# Patient Record
Sex: MALE | Race: WHITE | NOT HISPANIC OR LATINO | Employment: FULL TIME | URBAN - METROPOLITAN AREA
[De-identification: names, ages, dates, MRNs, and addresses within clinical notes are randomized per-mention and may not be internally consistent; named-entity substitution may affect disease eponyms.]

---

## 2019-05-08 LAB
PSA: 0.9
TESTOST SERPL-MCNC: 674 NG/DL

## 2019-12-13 ENCOUNTER — OFFICE VISIT (OUTPATIENT)
Dept: FAMILY MEDICINE | Facility: CLINIC | Age: 49
End: 2019-12-13
Payer: COMMERCIAL

## 2019-12-13 VITALS
HEIGHT: 69 IN | HEART RATE: 61 BPM | DIASTOLIC BLOOD PRESSURE: 64 MMHG | SYSTOLIC BLOOD PRESSURE: 91 MMHG | WEIGHT: 156 LBS | TEMPERATURE: 98 F | BODY MASS INDEX: 23.11 KG/M2

## 2019-12-13 DIAGNOSIS — J02.9 SORE THROAT: Primary | ICD-10-CM

## 2019-12-13 DIAGNOSIS — J02.0 STREP THROAT: ICD-10-CM

## 2019-12-13 PROBLEM — N41.1 CHRONIC PROSTATITIS: Status: ACTIVE | Noted: 2019-12-13

## 2019-12-13 PROBLEM — K21.9 GASTROESOPHAGEAL REFLUX DISEASE WITHOUT ESOPHAGITIS: Status: ACTIVE | Noted: 2019-12-07

## 2019-12-13 PROBLEM — E04.1 THYROID NODULE: Status: ACTIVE | Noted: 2019-12-07

## 2019-12-13 PROCEDURE — 3008F BODY MASS INDEX DOCD: CPT | Mod: CPTII,S$GLB,, | Performed by: FAMILY MEDICINE

## 2019-12-13 PROCEDURE — 99203 PR OFFICE/OUTPT VISIT, NEW, LEVL III, 30-44 MIN: ICD-10-PCS | Mod: S$GLB,,, | Performed by: FAMILY MEDICINE

## 2019-12-13 PROCEDURE — 99203 OFFICE O/P NEW LOW 30 MIN: CPT | Mod: S$GLB,,, | Performed by: FAMILY MEDICINE

## 2019-12-13 PROCEDURE — 99999 PR PBB SHADOW E&M-NEW PATIENT-LVL III: ICD-10-PCS | Mod: PBBFAC,,, | Performed by: FAMILY MEDICINE

## 2019-12-13 PROCEDURE — 99999 PR PBB SHADOW E&M-NEW PATIENT-LVL III: CPT | Mod: PBBFAC,,, | Performed by: FAMILY MEDICINE

## 2019-12-13 PROCEDURE — 3008F PR BODY MASS INDEX (BMI) DOCUMENTED: ICD-10-PCS | Mod: CPTII,S$GLB,, | Performed by: FAMILY MEDICINE

## 2019-12-13 RX ORDER — FOLIC ACID 1 MG/1
1 TABLET ORAL DAILY
COMMUNITY
End: 2020-10-16

## 2019-12-13 RX ORDER — PREDNISONE 20 MG/1
20 TABLET ORAL DAILY
Qty: 5 TABLET | Refills: 0 | Status: SHIPPED | OUTPATIENT
Start: 2019-12-13 | End: 2019-12-18

## 2019-12-13 RX ORDER — VIT C/E/ZN/COPPR/LUTEIN/ZEAXAN 250MG-90MG
1000 CAPSULE ORAL
COMMUNITY

## 2019-12-13 RX ORDER — AMOXICILLIN 875 MG/1
TABLET, FILM COATED ORAL
COMMUNITY
End: 2020-09-21

## 2019-12-13 RX ORDER — PANTOPRAZOLE SODIUM 40 MG/1
40 TABLET, DELAYED RELEASE ORAL
COMMUNITY
Start: 2016-02-02 | End: 2021-06-16 | Stop reason: SDUPTHER

## 2019-12-13 RX ORDER — NICOTINE POLACRILEX 2 MG
GUM BUCCAL
COMMUNITY

## 2019-12-13 RX ORDER — CRANBERRY FRUIT EXTRACT 650 MG
1 CAPSULE ORAL
COMMUNITY

## 2019-12-13 RX ORDER — PROMETHAZINE HYDROCHLORIDE AND DEXTROMETHORPHAN HYDROBROMIDE 6.25; 15 MG/5ML; MG/5ML
SYRUP ORAL
Refills: 0 | COMMUNITY
Start: 2019-12-07 | End: 2020-10-16

## 2019-12-13 NOTE — PROGRESS NOTES
=========  PLAN:      Problem List Items Addressed This Visit     Sore throat - Primary    Relevant Medications    predniSONE (DELTASONE) 20 MG tablet    Strep throat     Discussed condition course and signs and symptoms to expect.  Patient advised take anti-inflammatories and or Tylenol for pain or fever.  ER precautions.  Call MD or follow-up to clinic if not improving or worsening symptoms.\           Relevant Medications    predniSONE (DELTASONE) 20 MG tablet        Future Appointments     Date Provider Specialty Appt Notes    1/10/2020 Chato Franklin MD Family Medicine annual           Medication List with Changes/Refills   New Medications    PREDNISONE (DELTASONE) 20 MG TABLET    Take 1 tablet (20 mg total) by mouth once daily. for 5 days   Current Medications    AMOXICILLIN (AMOXIL) 875 MG TABLET    amoxicillin 875 mg tablet   Take 1 tablet every 12 hours by oral route with meals.    BIOTIN 1 MG CAP    Take by mouth.    CHOLECALCIFEROL, VITAMIN D3, (VITAMIN D3) 25 MCG (1,000 UNIT) CAPSULE    Take 1,000 Units by mouth.    FOLIC ACID (FOLVITE) 1 MG TABLET    Take 1 mg by mouth once daily.    PANTOPRAZOLE (PROTONIX) 40 MG TABLET    Take 40 mg by mouth.    PRASTERONE, DHEA, 25 MG CAP    Take 1 capsule by mouth.    PROMETHAZINE-DEXTROMETHORPHAN (PROMETHAZINE-DM) 6.25-15 MG/5 ML SYRP    take ONE TEASPOONFUL BY MOUTH AT BEDTIME       Chato Franklin M.D.     ==========================================================================  Subjective:      Patient ID: Fortino Castro is a 49 y.o. male.  has a past medical history of GERD (gastroesophageal reflux disease).     Chief Complaint: Establish Care and Sore Throat      Problem List Items Addressed This Visit     Sore throat - Primary    Strep throat    Overview     New problem.    + at outside facility about a week ago.  Started last Saturday       Took amoxicillin   Medrol dose pack not getting any better.  Patient feels lump in his throat and getting  hoarse.  Associated with cough.             Current Assessment & Plan     Discussed condition course and signs and symptoms to expect.  Patient advised take anti-inflammatories and or Tylenol for pain or fever.  ER precautions.  Call MD or follow-up to clinic if not improving or worsening symptoms.\                  Past Medical History:  Past Medical History:   Diagnosis Date    GERD (gastroesophageal reflux disease)      Past Surgical History:   Procedure Laterality Date    HERNIA REPAIR       Review of patient's allergies indicates:   Allergen Reactions    Clarithromycin Palpitations     Medication List with Changes/Refills   New Medications    PREDNISONE (DELTASONE) 20 MG TABLET    Take 1 tablet (20 mg total) by mouth once daily. for 5 days   Current Medications    AMOXICILLIN (AMOXIL) 875 MG TABLET    amoxicillin 875 mg tablet   Take 1 tablet every 12 hours by oral route with meals.    BIOTIN 1 MG CAP    Take by mouth.    CHOLECALCIFEROL, VITAMIN D3, (VITAMIN D3) 25 MCG (1,000 UNIT) CAPSULE    Take 1,000 Units by mouth.    FOLIC ACID (FOLVITE) 1 MG TABLET    Take 1 mg by mouth once daily.    PANTOPRAZOLE (PROTONIX) 40 MG TABLET    Take 40 mg by mouth.    PRASTERONE, DHEA, 25 MG CAP    Take 1 capsule by mouth.    PROMETHAZINE-DEXTROMETHORPHAN (PROMETHAZINE-DM) 6.25-15 MG/5 ML SYRP    take ONE TEASPOONFUL BY MOUTH AT BEDTIME      Social History     Tobacco Use    Smoking status: Never Smoker    Smokeless tobacco: Former User   Substance Use Topics    Alcohol use: Not Currently     Frequency: Never      Family History   Problem Relation Age of Onset    Arrhythmia Mother     Alzheimer's disease Father        I have reviewed the complete PMH, social history, surgical history, allergies and medications.  As well as family history.    Review of Systems   Constitutional: Negative for chills, fatigue, fever and unexpected weight change.   HENT: Positive for postnasal drip and sore throat. Negative for ear pain.   "  Eyes: Negative for redness and visual disturbance.   Respiratory: Negative for cough and shortness of breath.    Cardiovascular: Negative for chest pain and palpitations.   Gastrointestinal: Negative for nausea and vomiting.   Endocrine: Negative for cold intolerance and heat intolerance.   Genitourinary: Negative for difficulty urinating and hematuria.   Musculoskeletal: Negative for arthralgias and myalgias.   Skin: Negative for rash and wound.   Allergic/Immunologic: Negative for environmental allergies and food allergies.   Neurological: Negative for weakness and headaches.   Hematological: Negative for adenopathy. Does not bruise/bleed easily.   Psychiatric/Behavioral: Negative for sleep disturbance. The patient is not nervous/anxious.      Objective:   BP 91/64   Pulse 61   Temp 98.2 °F (36.8 °C) (Oral)   Ht 5' 9" (1.753 m)   Wt 70.8 kg (156 lb)   BMI 23.04 kg/m²   Physical Exam   Constitutional: He is oriented to person, place, and time. He appears well-developed and well-nourished. No distress.   HENT:   Head: Normocephalic and atraumatic.   Right Ear: Hearing and external ear normal.   Left Ear: Hearing and external ear normal.   Nose: Mucosal edema and rhinorrhea present.   Mouth/Throat: Posterior oropharyngeal erythema present. No oropharyngeal exudate.   Eyes: Pupils are equal, round, and reactive to light. EOM are normal.   Neck: Normal range of motion. Neck supple.   Cardiovascular: Normal rate, regular rhythm, normal heart sounds and intact distal pulses.   No murmur heard.  Pulmonary/Chest: Effort normal and breath sounds normal. No respiratory distress. He has no wheezes.   Musculoskeletal: Normal range of motion. He exhibits no edema.   Neurological: He is alert and oriented to person, place, and time. No cranial nerve deficit.   Skin: Skin is warm and dry. Capillary refill takes less than 2 seconds.   Psychiatric: He has a normal mood and affect. His behavior is normal.   Nursing note and " vitals reviewed.      Assessment:     1. Sore throat    2. Strep throat      MDM:     I have Reviewed and summarized old records.  I have performed thorough medication reconciliation today and discussed risk and benefits of each medication.  I have reviewed labs and discussed with patient.  All questions were answered.  I am requesting old records and will review them once they are available.    I have signed for the following orders AND/OR meds.  No orders of the defined types were placed in this encounter.    Medications Ordered This Encounter   Medications    predniSONE (DELTASONE) 20 MG tablet     Sig: Take 1 tablet (20 mg total) by mouth once daily. for 5 days     Dispense:  5 tablet     Refill:  0        Follow up in about 4 weeks (around 1/10/2020), or if symptoms worsen or fail to improve, for Annual Wellness Exam.    If no improvement in symptoms or symptoms worsen, advised to call/follow-up at clinic or go to ER. Patient voiced understanding and all questions/concerns were addressed.     DISCLAIMER: This note was compiled by using a speech recognition dictation system and therefore please be aware that typographical / speech recognition errors can and do occur.  Please contact me if you see any errors specifically.    Chato Franklin M.D.       Office: [phone]   [address] [place] [address]  FAX: [phone]  =============================================  GOAL of current visit:     Previous PCP:none  Specialists:Eliu Almonte endocrinologist  Recent lab work:Dr. Eliu Almonte  Recent imaging: Dr. Eliu Almonte  Colonoscopy History:none    Health Maintenance Due   Topic Date Due    Lipid Panel  1971    TETANUS VACCINE  09/30/1989     ======================================================

## 2019-12-13 NOTE — ASSESSMENT & PLAN NOTE
Discussed condition course and signs and symptoms to expect.  Patient advised take anti-inflammatories and or Tylenol for pain or fever.  ER precautions.  Call MD or follow-up to clinic if not improving or worsening symptoms.\

## 2019-12-13 NOTE — PATIENT INSTRUCTIONS
Follow up if symptoms worsen or fail to improve.     If no improvement in symptoms or symptoms worsen, please be advised to call MD, follow-up at clinic and/or go to ER if becomes severe.    Chato Franklin M.D.         We Offer TELEHEATLH & Same Day Appointments!   Book your Telehealth appointment with me through my nurse or   Clinic appointments on [employer]!    [address] [place] [address]    Office: [phone]     FAX: [phone]    Check out my Facebook Page and Follow Me at: CLICK HERE    Check out my website at [employer] by clicking on: CLICK HERE    To Schedule appointments online, go to [employer]: CLICK HERE     Location: [email or web address]

## 2019-12-19 ENCOUNTER — PATIENT OUTREACH (OUTPATIENT)
Dept: ADMINISTRATIVE | Facility: HOSPITAL | Age: 49
End: 2019-12-19

## 2020-09-16 ENCOUNTER — TELEPHONE (OUTPATIENT)
Dept: FAMILY MEDICINE | Facility: CLINIC | Age: 50
End: 2020-09-16

## 2020-09-16 NOTE — TELEPHONE ENCOUNTER
----- Message from Chary Robles sent at 9/16/2020 10:35 AM CDT -----  Please call lpt @ [phone] regarding schedule a vritual appt today, no availabe appt,

## 2020-09-16 NOTE — TELEPHONE ENCOUNTER
Returned a call to the patient, patient states that he got an appointment with Dr. Serna today on a video visit but was curious of how it worked.  Advised patient how a virtual visit is handled and walked patient through the process.  Patient verbalized understanding of this.

## 2020-09-18 ENCOUNTER — TELEPHONE (OUTPATIENT)
Dept: FAMILY MEDICINE | Facility: CLINIC | Age: 50
End: 2020-09-18

## 2020-09-18 DIAGNOSIS — Z03.818 ENCOUNTER FOR OBSERVATION FOR SUSPECTED EXPOSURE TO OTHER BIOLOGICAL AGENTS RULED OUT: ICD-10-CM

## 2020-09-18 DIAGNOSIS — R50.9 FEVER: ICD-10-CM

## 2020-09-18 DIAGNOSIS — M79.10 MUSCLE PAIN: ICD-10-CM

## 2020-09-18 DIAGNOSIS — Z20.822 EXPOSURE TO COVID-19 VIRUS: Primary | ICD-10-CM

## 2020-09-18 NOTE — TELEPHONE ENCOUNTER
----- Message from Ashley Cantu sent at 9/18/2020 12:22 PM CDT -----  Regarding: orders  Contact: pt [phone]  Pt is calling to get orders for covid test. Please contact pt [phone]

## 2020-09-18 NOTE — TELEPHONE ENCOUNTER
Returned call  To patient, patient states that he has now become symptomatic for covid as his wife has tested positive for covid and now he is have very high temps and body aches denies any cough.Patietn states that he wants to know if he should now be tested covid.  Covid test pended.  Please advise.

## 2020-09-18 NOTE — TELEPHONE ENCOUNTER
----- Message from Ashley Cantu sent at 9/18/2020 12:22 PM CDT -----  Regarding: orders  Contact: pt [phone]  Pt is calling to get orders for covid test. Please contact pt [phone]

## 2020-09-19 ENCOUNTER — CLINICAL SUPPORT (OUTPATIENT)
Dept: FAMILY MEDICINE | Facility: CLINIC | Age: 50
End: 2020-09-19
Payer: COMMERCIAL

## 2020-09-19 DIAGNOSIS — M79.10 MUSCLE PAIN: ICD-10-CM

## 2020-09-19 DIAGNOSIS — R50.9 FEVER: ICD-10-CM

## 2020-09-19 DIAGNOSIS — Z20.822 EXPOSURE TO COVID-19 VIRUS: ICD-10-CM

## 2020-09-19 DIAGNOSIS — Z03.818 ENCOUNTER FOR OBSERVATION FOR SUSPECTED EXPOSURE TO OTHER BIOLOGICAL AGENTS RULED OUT: ICD-10-CM

## 2020-09-19 PROCEDURE — U0003 INFECTIOUS AGENT DETECTION BY NUCLEIC ACID (DNA OR RNA); SEVERE ACUTE RESPIRATORY SYNDROME CORONAVIRUS 2 (SARS-COV-2) (CORONAVIRUS DISEASE [COVID-19]), AMPLIFIED PROBE TECHNIQUE, MAKING USE OF HIGH THROUGHPUT TECHNOLOGIES AS DESCRIBED BY CMS-2020-01-R: HCPCS

## 2020-09-20 DIAGNOSIS — U07.1 COVID-19 VIRUS DETECTED: ICD-10-CM

## 2020-09-20 LAB — SARS-COV-2 RNA RESP QL NAA+PROBE: DETECTED

## 2020-09-21 ENCOUNTER — NURSE TRIAGE (OUTPATIENT)
Dept: ADMINISTRATIVE | Facility: CLINIC | Age: 50
End: 2020-09-21

## 2020-09-21 ENCOUNTER — OFFICE VISIT (OUTPATIENT)
Dept: FAMILY MEDICINE | Facility: CLINIC | Age: 50
End: 2020-09-21
Payer: COMMERCIAL

## 2020-09-21 VITALS — TEMPERATURE: 102 F

## 2020-09-21 DIAGNOSIS — Z01.84 ENCOUNTER FOR ANTIBODY RESPONSE EXAMINATION: ICD-10-CM

## 2020-09-21 DIAGNOSIS — U07.1 COVID-19: Primary | ICD-10-CM

## 2020-09-21 PROBLEM — J02.0 STREP THROAT: Status: RESOLVED | Noted: 2019-12-13 | Resolved: 2020-09-21

## 2020-09-21 PROBLEM — J02.9 SORE THROAT: Status: RESOLVED | Noted: 2019-12-13 | Resolved: 2020-09-21

## 2020-09-21 PROCEDURE — 99213 OFFICE O/P EST LOW 20 MIN: CPT | Mod: 95,,, | Performed by: FAMILY MEDICINE

## 2020-09-21 PROCEDURE — 99213 PR OFFICE/OUTPT VISIT, EST, LEVL III, 20-29 MIN: ICD-10-PCS | Mod: 95,,, | Performed by: FAMILY MEDICINE

## 2020-09-21 RX ORDER — IBUPROFEN 800 MG/1
800 TABLET ORAL EVERY 8 HOURS PRN
Qty: 30 TABLET | Refills: 0
Start: 2020-09-21 | End: 2020-10-01

## 2020-09-21 RX ORDER — DEXAMETHASONE 0.75 MG/1
1.5 TABLET ORAL DAILY
Qty: 10 TABLET | Refills: 0 | Status: SHIPPED | OUTPATIENT
Start: 2020-09-21 | End: 2020-09-26

## 2020-09-21 RX ORDER — ACETAMINOPHEN 500 MG
1000 TABLET ORAL EVERY 6 HOURS PRN
Refills: 0
Start: 2020-09-21 | End: 2023-08-31

## 2020-09-21 NOTE — PATIENT INSTRUCTIONS
Follow up if symptoms worsen or fail to improve.     If no improvement in symptoms or symptoms worsen, please be advised to call MD, follow-up at clinic and/or go to ER if becomes severe.    Chato Franklin M.D.        We Offer TELEHEALTH & Same Day Appointments!   Book your Telehealth appointment with me through my nurse or   Clinic appointments on [employer]!    [address] [place] [address]    Office: [phone]   FAX: [phone]    Check out my Facebook Page and Follow Me at: [email or web address]    Check out my website at [employer] by clicking on: [email or web address]    To Schedule appointments online, go to [employer]: [email or web address]

## 2020-09-21 NOTE — TELEPHONE ENCOUNTER
"Pt verified identity by spelling first and last names and verifying . Pt chief c/o: fever and fever symptoms. Pt states that he has fever for the past 6 days and has been taking fever-reducing medications that are effective at times that help bring his fever down to the 98-99 degree F range, but at night time he is most concerned because his fever is consistently elevating to 104 degrees F. Instructed pt that fevers of 104 F should be addressed as an emergent issue that should be evaluated by a provider; pt voices understanding. Pt states that he had the severe chills last night and took a hot bath and then went and "wrapped up" in a thick blanket and fell asleep and thinks that this might've exacerbated his fever and related symptoms. Advised pt that he should avoid hot baths and to drink plenty of water to stay hydration during this time of illness; pt voiced understanding. Pt is also states that he is concerned because he has difficulty ever getting to speak to someone in his provider's office when having issues like the current. Advised pt to call [facility] any time to address such concerns and that the nurses there will be able to triage him accordingly and to help get help to the level of care or services needed at that time; pt voiced understanding. Pt states that he is up and cooking breakfast at this time and feels pretty well despite reporting fever of 101.4 F (temporal) at this time.Pt negative for wheezing or stridor. Pt denies chest tightness or pressure. Pt denies cough except while clearing throat from post-nasal drip. Please see COVID-19 assessment and protocol evaluation for further details. Care advice provided and pt advised that [facility] RN will communicate with his PCP now and that if he hasn't heard from his their office within one hour to call [facility] at that time; pt voiced understanding and agrees with advice. Instructed pt to consider self as infectious and to follow social distancing, vigorous " handwashing, cover cough and sneezes, wear a mask while around others in the home, wipe down cell phone several times daily with an antibacterial wipe, and quarantine techniques; pt voiced verbal understanding and agrees with information. Instructed pt to call [facility] back for further assistance if needed or if symptoms worsened and call 911 for all emergencies; pt voiced verbal understanding and agrees with advice. Encounter routed to Dr. Franklin's office.    Reason for Disposition   Fever > 103 F (39.4 C)    Additional Information   Negative: Severe difficulty breathing (e.g., struggling for each breath, speaks in single words)   Negative: Difficult to awaken or acting confused (e.g., disoriented, slurred speech)   Negative: Bluish (or gray) lips or face now   Negative: Shock suspected (e.g., cold/pale/clammy skin, too weak to stand, low BP, rapid pulse)   Negative: Sounds like a life-threatening emergency to the triager   Negative: [1] COVID-19 exposure AND [2] no symptoms   Negative: COVID-19 and Breastfeeding, questions about   Negative: [1] Adult with possible COVID-19 symptoms AND [2] triager concerned about severity of symptoms or other causes   Negative: SEVERE or constant chest pain or pressure (Exception: mild central chest pain, present only when coughing)   Negative: MODERATE difficulty breathing (e.g., speaks in phrases, SOB even at rest, pulse 100-120)   Negative: MILD difficulty breathing (e.g., minimal/no SOB at rest, SOB with walking, pulse <100)   Negative: Chest pain   Negative: Patient sounds very sick or weak to the triager    Protocols used: CORONAVIRUS (COVID-19) DIAGNOSED OR [id number]

## 2020-09-21 NOTE — ASSESSMENT & PLAN NOTE
Start Decadron and increase ibuprofen to 800 mg every 6 to 8 hr.  Continue Tylenol 1000 mg every 6 hr up to 4 g daily.    Your test was POSITIVE for COVID-19 (coronavirus).       Please isolate yourself at home.  You may leave home and/or return to work once the following conditions are met:    If you were not hospitalized and are not severely immunocompromised*:   More than 10 days since symptoms first appeared AND   More than 24 hours fever free without medications AND   Symptoms have improved     If you were hospitalized OR are severely immunocompromised*:   More than 20 days since symptoms first appeared   More than 24 hrs hours fever free without medications   Symptoms have improved    If you had no symptoms but tested postivepositive:   More than 10 days since the date of the first positive test (20 days if severely immunocompromised).   If you develop symptoms, then use the guidelines above.     *Definition of severely immunocompromised:  - Current chemotherapy for cancer  - Untreated HIV with CD4 count less than 200  - Combined primary immunodeficiency disorder  - Prednisone more than 20 mg per day for more than 14 days  - Post-transplant patients    Additional instructions:   Separate yourself from other people and animals in your home.   Call ahead before visiting your doctor.   Wear a facemask when around others.   Cover your coughs and sneezes.   Wash your hands often with soap and water; hand  can be used, too.   Avoid sharing personal household items.   Wipe down surfaces used daily.   Monitor your symptoms. Seek prompt medical attention if your illness is worsening (e.g., difficulty breathing).    Before seeking care, call your healthcare provider.   If you have a medical emergency and need to call 911, notify the dispatch personnel that you have, or are being evaluated for COVID-19. If possible, put on a facemask before emergency medical services arrive.      Contact  Tracing    As one of the next steps, you will receive a call or text from the [place] Department of Health ([facility]) COVID-19 Tracing Team. See the contact information below so you know not to ignore the health departments call. It is important that you contact them back immediately so they can help.      Contact Tracer Number:  [phone]  Caller ID for most carriers: [place] Dept Health     What is contact tracing?  · Contact tracing is a process that helps identify everyone who has been in close contact with an infected person. Contact tracers let those people know they may have been exposed and guide them on next steps. Confidentiality is important for everyone; no one will be told who may have exposed them to the virus.  · Your involvement is important. The more we know about where and how this virus is spreading, the better chance we have at stopping it from spreading further.  What does exposure mean?  · Exposure means you have been within 6 feet for more than 15 minutes with a person who has or had COVID-19.  What kind of questions do the contact tracers ask?  · A contact tracer will confirm your basic contact information including name, address, phone number, and next of kin, as well as asking about any symptoms you may have had. Theyll also ask you how you think you may have gotten sick, such as places where you may have been exposed to the virus, and people you were with. Those names will never be shared with anyone outside of that call, and will only be used to help trace and stop the spread of the virus.   I have privacy concerns. How will the state use my information?  · Your privacy about your health is important. All calls are completed using call centers that use the appropriate health privacy protection measures (HIPAA compliance), meaning that your patient information is safe. No one will ever ask you any questions related to immigration status. Your health comes first.   Do I have to  participate?  · You do not have to participate, but we strongly encourage you to. Contact tracing can help us catch and control new outbreaks as theyre developing to keep your friends and family safe.   What if I dont hear from anyone?  · If you dont receive a call within 24 hours, you can call the number above right away to inquire about your status. That line is open from 8:00 am - 8:00 p.m., 7 days a week.  Contact tracing saves lives! Together, we have the power to beat this virus and keep our loved ones and neighbors safe.    For more information see CDC link below.      https://www.cdc.gov/coronavirus/2019-ncov/hcp/guidance-prevent-spread.html#precautions        Sources:  CDC, [place] Department [facility] and [facility]

## 2020-09-21 NOTE — PROGRESS NOTES
Primary Care Telemedicine Note    The patient location is:  Patient Home - [place]  The chief complaint leading to consultation is:   Chief Complaint   Patient presents with    COVID-19 Concerns      Total time spent with patient:  16 min    Visit type: Virtual visit with synchronous audio only and video  Each patient to whom he or she provides medical services by telemedicine is:  (1) informed of the relationship between the physician and patient and the respective role of any other health care provider with respect to management of the patient; and (2) notified that he or she may decline to receive medical services by telemedicine and may withdraw from such care at any time.  =================================================================  ==========================================================================  Subjective/Assessment/ Plan:      Patient ID: Fortino Castro is a 49 y.o. male.  has a past medical history of GERD (gastroesophageal reflux disease).   Chief Complaint: COVID-19 Concerns    Problem List Items Addressed This Visit     COVID-19 - Primary    Overview     Acute.  Started last week.  Patient was exposed to family member who was positive for COVID.    Patient reports that he Started having symptoms on 9/16  He came here and was Tested positive on September 19th  He is currently taking:  Tylenol 1000mg every 6 hr  Ibuprofen 400mg every 8 hours   Pulse ox showing HR 67     + high fever   + loss of taste     Denies nausea, vomiting, abdominal pain, shortness of breath, chest pain, diarrhea.  Etc.             Current Assessment & Plan     Start Decadron and increase ibuprofen to 800 mg every 6 to 8 hr.  Continue Tylenol 1000 mg every 6 hr up to 4 g daily.    Your test was POSITIVE for COVID-19 (coronavirus).       Please isolate yourself at home.  You may leave home and/or return to work once the following conditions are met:    If you were not hospitalized and are not severely  immunocompromised*:   More than 10 days since symptoms first appeared AND   More than 24 hours fever free without medications AND   Symptoms have improved     If you were hospitalized OR are severely immunocompromised*:   More than 20 days since symptoms first appeared   More than 24 hrs hours fever free without medications   Symptoms have improved    If you had no symptoms but tested postivepositive:   More than 10 days since the date of the first positive test (20 days if severely immunocompromised).   If you develop symptoms, then use the guidelines above.     *Definition of severely immunocompromised:  - Current chemotherapy for cancer  - Untreated HIV with CD4 count less than 200  - Combined primary immunodeficiency disorder  - Prednisone more than 20 mg per day for more than 14 days  - Post-transplant patients    Additional instructions:   Separate yourself from other people and animals in your home.   Call ahead before visiting your doctor.   Wear a facemask when around others.   Cover your coughs and sneezes.   Wash your hands often with soap and water; hand  can be used, too.   Avoid sharing personal household items.   Wipe down surfaces used daily.   Monitor your symptoms. Seek prompt medical attention if your illness is worsening (e.g., difficulty breathing).    Before seeking care, call your healthcare provider.   If you have a medical emergency and need to call 911, notify the dispatch personnel that you have, or are being evaluated for COVID-19. If possible, put on a facemask before emergency medical services arrive.      Contact Tracing    As one of the next steps, you will receive a call or text from the [facility] ([facility]) COVID-19 Tracing Team. See the contact information below so you know not to ignore the health departments call. It is important that you contact them back immediately so they can help.      Contact Tracer Number:  [phone]  Caller ID  for most carriers: [facility]     What is contact tracing?  · Contact tracing is a process that helps identify everyone who has been in close contact with an infected person. Contact tracers let those people know they may have been exposed and guide them on next steps. Confidentiality is important for everyone; no one will be told who may have exposed them to the virus.  · Your involvement is important. The more we know about where and how this virus is spreading, the better chance we have at stopping it from spreading further.  What does exposure mean?  · Exposure means you have been within 6 feet for more than 15 minutes with a person who has or had COVID-19.  What kind of questions do the contact tracers ask?  · A contact tracer will confirm your basic contact information including name, address, phone number, and next of kin, as well as asking about any symptoms you may have had. Theyll also ask you how you think you may have gotten sick, such as places where you may have been exposed to the virus, and people you were with. Those names will never be shared with anyone outside of that call, and will only be used to help trace and stop the spread of the virus.   I have privacy concerns. How will the state use my information?  · Your privacy about your health is important. All calls are completed using call centers that use the appropriate health privacy protection measures (HIPAA compliance), meaning that your patient information is safe. No one will ever ask you any questions related to immigration status. Your health comes first.   Do I have to participate?  · You do not have to participate, but we strongly encourage you to. Contact tracing can help us catch and control new outbreaks as theyre developing to keep your friends and family safe.   What if I dont hear from anyone?  · If you dont receive a call within 24 hours, you can call the number above right away to inquire about your status. That line  is open from 8:00 am - 8:00 p.m., 7 days a week.  Contact tracing saves lives! Together, we have the power to beat this virus and keep our loved ones and neighbors safe.    For more information see CDC link below.      https://www.cdc.gov/coronavirus/2019-ncov/hcp/guidance-prevent-spread.html#precautions        Sources:  [facility], [place] [facility]                I have reviewed the complete PMH, Family History, social history, surgical history, allergies and medications per Epic record at the time of this visit.  Review of Systems see HPI otherwise negative  Objective   Temp (!) 101.5 °F (38.6 °C)   Physical Exam  Constitutional:       General: He is not in acute distress.     Appearance: He is well-developed. He is ill-appearing. He is not diaphoretic.   HENT:      Head: Normocephalic and atraumatic.   Eyes:      Pupils: Pupils are equal, round, and reactive to light.   Neck:      Musculoskeletal: Normal range of motion.   Pulmonary:      Effort: Pulmonary effort is normal.   Musculoskeletal: Normal range of motion.   Skin:     Findings: No rash.   Neurological:      Mental Status: He is alert and oriented to person, place, and time.   Psychiatric:         Attention and Perception: He is attentive.         Mood and Affect: Mood is not anxious or depressed. Affect is not labile, blunt, angry or inappropriate.         Speech: He is communicative. Speech is not rapid and pressured, delayed, slurred or tangential.         Behavior: Behavior normal. Behavior is not agitated, slowed, aggressive, withdrawn, hyperactive or combative.         Thought Content: Thought content normal. Thought content is not paranoid or delusional. Thought content does not include homicidal or suicidal ideation. Thought content does not include homicidal or suicidal plan.         Cognition and Memory: Memory is not impaired.         Judgment: Judgment normal. Judgment is not impulsive or inappropriate.       Assessment:      1. COVID-19      I have signed for the following orders AND/OR meds.  Orders Placed This Encounter   Procedures    Wilder Patient Entered Blood Pressure     Order Specific Question:   After how many days would you like to receive a notification of this patient's flowsheet entries?     Answer:   30    Jaimet Patient Entered Pulse     Order Specific Question:   After how many days would you like to receive a notification of this patient's flowsheet entries?     Answer:   30    Jaimet Patient Entered Weight     Please track your weight day so that I can review the daily control that you have.  This will be uploaded to your EPIC chart at our office so that we can care for you better.  Dr. Mahad Finney     Order Specific Question:   After how many days would you like to receive a notification of this patient's flowsheet entries?     Answer:   30     Medications Ordered This Encounter   Medications    acetaminophen (TYLENOL) 500 MG tablet     Sig: Take 2 tablets (1,000 mg total) by mouth every 6 (six) hours as needed for Pain or Temperature greater than (MAX of 4 g daily).     Refill:  0    dexAMETHasone (DECADRON) 0.75 MG Tab     Sig: Take 2 tablets (1.5 mg total) by mouth once daily. for 5 days     Dispense:  10 tablet     Refill:  0    ibuprofen (ADVIL,MOTRIN) 800 MG tablet     Sig: Take 1 tablet (800 mg total) by mouth every 8 (eight) hours as needed for Pain.     Dispense:  30 tablet     Refill:  0      Medication List with Changes/Refills   New Medications    ACETAMINOPHEN (TYLENOL) 500 MG TABLET    Take 2 tablets (1,000 mg total) by mouth every 6 (six) hours as needed for Pain or Temperature greater than (MAX of 4 g daily).    DEXAMETHASONE (DECADRON) 0.75 MG TAB    Take 2 tablets (1.5 mg total) by mouth once daily. for 5 days    IBUPROFEN (ADVIL,MOTRIN) 800 MG TABLET    Take 1 tablet (800 mg total) by mouth every 8 (eight) hours as needed for Pain.   Current Medications    BIOTIN 1 MG CAP    Take by  mouth.    CHOLECALCIFEROL, VITAMIN D3, (VITAMIN D3) 25 MCG (1,000 UNIT) CAPSULE    Take 1,000 Units by mouth.    FOLIC ACID (FOLVITE) 1 MG TABLET    Take 1 mg by mouth once daily.    PANTOPRAZOLE (PROTONIX) 40 MG TABLET    Take 40 mg by mouth.    PRASTERONE, DHEA, 25 MG CAP    Take 1 capsule by mouth.    PROMETHAZINE-DEXTROMETHORPHAN (PROMETHAZINE-DM) 6.25-15 MG/5 ML SYRP    take ONE TEASPOONFUL BY MOUTH AT BEDTIME   Discontinued Medications    AMOXICILLIN (AMOXIL) 875 MG TABLET    amoxicillin 875 mg tablet   Take 1 tablet every 12 hours by oral route with meals.     Follow up if symptoms worsen or fail to improve.  Future Appointments     Date Provider Specialty Appt Notes    9/21/2020 Chato Franklin MD Family Medicine Positive COVID, Fever >103, chills  ok to book per Dr. Franklin          If no improvement in symptoms or symptoms worsen, advised to call/follow-up at clinic or go to ER. Patient voiced understanding and all questions/concerns were addressed.   DISCLAIMER: This note was compiled by using a speech recognition dictation system and therefore please be aware that typographical / speech recognition errors can and do occur.  Please contact me if you see any errors specifically.  Chato Franklin M.D.

## 2020-09-21 NOTE — TELEPHONE ENCOUNTER
Patient states that he has stuffy nose, post nasal drip and fever.  Patient states his main concern is his fever going above 103.  Patient states that he has been alternating Tylenol and Motrin, but since yesterday the Tylenol has not been working as well as in the beginning.  Patient also having chills.  I got patient scheduled for today at 2:20.

## 2020-09-28 ENCOUNTER — TELEPHONE (OUTPATIENT)
Dept: FAMILY MEDICINE | Facility: CLINIC | Age: 50
End: 2020-09-28

## 2020-09-28 NOTE — TELEPHONE ENCOUNTER
----- Message from Chato Franklin MD sent at 9/28/2020  8:14 AM CDT -----  Regarding: WORK RELEASE  PLEASE WRITE THIS PATIENT A LETTER STATING THAT HE CAN RETURN TO WORK ON SEPTEMBER 29TH.  HE IS NO LONGER HAVING ANY SYMPTOMS OF COVID-19.

## 2020-09-28 NOTE — TELEPHONE ENCOUNTER
Called and spoke with the patient, patient notified that work release was completed and in chart that can be accessed via My Chart liz.  Patient verbalized understanding of this.

## 2020-10-16 ENCOUNTER — OFFICE VISIT (OUTPATIENT)
Dept: FAMILY MEDICINE | Facility: CLINIC | Age: 50
End: 2020-10-16
Payer: COMMERCIAL

## 2020-10-16 VITALS
HEIGHT: 69 IN | BODY MASS INDEX: 23.25 KG/M2 | WEIGHT: 157 LBS | SYSTOLIC BLOOD PRESSURE: 104 MMHG | HEART RATE: 68 BPM | DIASTOLIC BLOOD PRESSURE: 67 MMHG | TEMPERATURE: 98 F

## 2020-10-16 DIAGNOSIS — Z00.00 WELL ADULT EXAM: Primary | ICD-10-CM

## 2020-10-16 DIAGNOSIS — Z13.220 ENCOUNTER FOR LIPID SCREENING FOR CARDIOVASCULAR DISEASE: ICD-10-CM

## 2020-10-16 DIAGNOSIS — E04.1 THYROID NODULE: ICD-10-CM

## 2020-10-16 DIAGNOSIS — Z79.899 ENCOUNTER FOR LONG-TERM (CURRENT) USE OF MEDICATIONS: ICD-10-CM

## 2020-10-16 DIAGNOSIS — Z12.5 ENCOUNTER FOR PROSTATE CANCER SCREENING: ICD-10-CM

## 2020-10-16 DIAGNOSIS — Z23 FLU VACCINE NEED: ICD-10-CM

## 2020-10-16 DIAGNOSIS — Z13.6 ENCOUNTER FOR LIPID SCREENING FOR CARDIOVASCULAR DISEASE: ICD-10-CM

## 2020-10-16 PROCEDURE — 99396 PREV VISIT EST AGE 40-64: CPT | Mod: 25,S$GLB,, | Performed by: FAMILY MEDICINE

## 2020-10-16 PROCEDURE — 99396 PR PREVENTIVE VISIT,EST,40-64: ICD-10-PCS | Mod: 25,S$GLB,, | Performed by: FAMILY MEDICINE

## 2020-10-16 PROCEDURE — 99999 PR PBB SHADOW E&M-EST. PATIENT-LVL IV: CPT | Mod: PBBFAC,,, | Performed by: FAMILY MEDICINE

## 2020-10-16 PROCEDURE — 99999 PR PBB SHADOW E&M-EST. PATIENT-LVL IV: ICD-10-PCS | Mod: PBBFAC,,, | Performed by: FAMILY MEDICINE

## 2020-10-16 PROCEDURE — 3008F BODY MASS INDEX DOCD: CPT | Mod: CPTII,S$GLB,, | Performed by: FAMILY MEDICINE

## 2020-10-16 PROCEDURE — 3008F PR BODY MASS INDEX (BMI) DOCUMENTED: ICD-10-PCS | Mod: CPTII,S$GLB,, | Performed by: FAMILY MEDICINE

## 2020-10-16 PROCEDURE — 90471 IMMUNIZATION ADMIN: CPT | Mod: S$GLB,,, | Performed by: FAMILY MEDICINE

## 2020-10-16 PROCEDURE — 90471 FLU VACCINE (QUAD) GREATER THAN OR EQUAL TO 3YO PRESERVATIVE FREE IM: ICD-10-PCS | Mod: S$GLB,,, | Performed by: FAMILY MEDICINE

## 2020-10-16 PROCEDURE — 90686 IIV4 VACC NO PRSV 0.5 ML IM: CPT | Mod: S$GLB,,, | Performed by: FAMILY MEDICINE

## 2020-10-16 PROCEDURE — 90686 FLU VACCINE (QUAD) GREATER THAN OR EQUAL TO 3YO PRESERVATIVE FREE IM: ICD-10-PCS | Mod: S$GLB,,, | Performed by: FAMILY MEDICINE

## 2020-10-16 NOTE — PATIENT INSTRUCTIONS
Follow up in about 1 year (around 10/16/2021), or if symptoms worsen or fail to improve, for Annual Wellness Exam.     If no improvement in symptoms or symptoms worsen, please be advised to call MD, follow-up at clinic and/or go to ER if becomes severe.    Chato Franklin M.D.        We Offer TELEHEALTH & Same Day Appointments!   Book your Telehealth appointment with me through my nurse or   Clinic appointments on [facility]!    [address] [place] [address]    Office: [phone]   FAX: [phone]    Check out my Facebook Page and Follow Me at: [email or web address]    Check out my website at [email or web address] by clicking on: [email or web address]    To Schedule appointments online, go to [email or web address]: [email or web address]

## 2020-10-16 NOTE — PROGRESS NOTES
This note is specifically for wellness visit performed today.     WELLNESS EXAM      Patient ID: Fortino Castro is a 50 y.o. male.  has a past medical history of GERD (gastroesophageal reflux disease) and Thyroid nodule (12/7/2019).     Chief Complaint:  Encounter for wellness exam    Well Adult Physical: Patient here for a comprehensive physical exam.The patient reports chronic problems.  Patient reports that he has recovered from COVID recently in the last 4 to 6 weeks.    Do you take any herbs or supplements that were not prescribed by a doctor?  Yes patient takes supplements  Are you taking calcium supplements?  Multivitamin     History:  Chronic prostatitis, hypotestosteronism followed by Dr. Acuna.  Patient is on DHEA:     Date last PSA:  Requesting records  No results found for: PSA   No history of STDs    Health Maintenance Topics with due status: Not Due       Topic Last Completion Date    TETANUS VACCINE 01/01/2011        ==============================================  History reviewed.     Health Maintenance Due   Topic Date Due    Hepatitis C Screening  1970    Lipid Panel  1970       Past Medical History:  Past Medical History:   Diagnosis Date    GERD (gastroesophageal reflux disease)     Thyroid nodule 12/7/2019     Past Surgical History:   Procedure Laterality Date    HERNIA REPAIR       Review of patient's allergies indicates:   Allergen Reactions    Clarithromycin Palpitations     Current Outpatient Medications on File Prior to Visit   Medication Sig Dispense Refill    acetaminophen (TYLENOL) 500 MG tablet Take 2 tablets (1,000 mg total) by mouth every 6 (six) hours as needed for Pain or Temperature greater than (MAX of 4 g daily).  0    biotin 1 mg Cap Take by mouth.      cholecalciferol, vitamin D3, (VITAMIN D3) 25 mcg (1,000 unit) capsule Take 1,000 Units by mouth.      pantoprazole (PROTONIX) 40 MG tablet Take 40 mg by mouth.      prasterone, dhea, 25 mg Cap Take 1  capsule by mouth.       No current facility-administered medications on file prior to visit.      Social History     Socioeconomic History    Marital status:      Spouse name: Not on file    Number of children: Not on file    Years of education: Not on file    Highest education level: Not on file   Occupational History    Not on file   Social Needs    Financial resource strain: Not on file    Food insecurity     Worry: Not on file     Inability: Not on file    Transportation needs     Medical: Not on file     Non-medical: Not on file   Tobacco Use    Smoking status: Never Smoker    Smokeless tobacco: Former User   Substance and Sexual Activity    Alcohol use: Not Currently     Frequency: Never    Drug use: Never    Sexual activity: Yes     Partners: Female   Lifestyle    Physical activity     Days per week: Not on file     Minutes per session: Not on file    Stress: Only a little   Relationships    Social connections     Talks on phone: Not on file     Gets together: Not on file     Attends Orthodoxy service: Not on file     Active member of club or organization: Not on file     Attends meetings of clubs or organizations: Not on file     Relationship status: Not on file   Other Topics Concern    Not on file   Social History Narrative    Not on file     Family History   Problem Relation Age of Onset    Arrhythmia Mother     Alzheimer's disease Father        Vitals:    10/16/20 1344   BP: 104/67   Pulse: 68   Temp: 97.9 °F (36.6 °C)      Body mass index is 23.18 kg/m².     Review of Systems   Constitutional: Negative for chills, fatigue, fever and unexpected weight change.   HENT: Negative for ear pain and sore throat.    Eyes: Negative for redness and visual disturbance.   Respiratory: Negative for cough and shortness of breath.    Cardiovascular: Negative for chest pain and palpitations.   Gastrointestinal: Negative for nausea and vomiting.   Endocrine: Negative for cold intolerance and  heat intolerance.   Genitourinary: Negative for difficulty urinating and hematuria.   Musculoskeletal: Negative for arthralgias and myalgias.   Skin: Negative for rash and wound.   Allergic/Immunologic: Negative for environmental allergies and food allergies.   Neurological: Negative for weakness and headaches.   Hematological: Negative for adenopathy. Does not bruise/bleed easily.   Psychiatric/Behavioral: Positive for sleep disturbance. The patient is not nervous/anxious.           Objective:      Physical Exam  Vitals signs and nursing note reviewed.   Constitutional:       General: He is not in acute distress.     Appearance: He is well-developed.   HENT:      Head: Normocephalic and atraumatic.   Eyes:      Pupils: Pupils are equal, round, and reactive to light.   Neck:      Musculoskeletal: Normal range of motion and neck supple.   Cardiovascular:      Rate and Rhythm: Normal rate and regular rhythm.      Heart sounds: Normal heart sounds. No murmur.   Pulmonary:      Effort: Pulmonary effort is normal. No respiratory distress.      Breath sounds: Normal breath sounds. No wheezing.   Musculoskeletal: Normal range of motion.   Skin:     General: Skin is warm and dry.      Capillary Refill: Capillary refill takes less than 2 seconds.   Neurological:      Mental Status: He is alert and oriented to person, place, and time.      Cranial Nerves: No cranial nerve deficit.   Psychiatric:         Behavior: Behavior normal.          Assessment / Plan:      1.  Routine health exam-patient here for annual wellness exam.  Labs ordered.  Health maintenance was reviewed and ordered.    COVID-19:  Resolved.  Check COVID antibodies.  Reflux:  Continue Protonix 40 daily. GERD RECOMMENDATIONS  caffeine reduction dietary changes elevate HOB NPO after supper  - Take PPI in the morning 30-60 minutes before breakfast  - Educated patient on lifestyle modifications to help control/reduce reflux/abdominal pain including: avoid large  meals, avoid eating within 2-3 hours of bedtime (avoid late night eating & lying down soon after eating), elevate head of bed if nocturnal symptoms are present, smoking cessation (if current smoker), & weight loss (if overweight).   - Educated to avoid known foods which trigger reflux symptoms & to minimize/avoid high-fat foods, chocolate, caffeine, citrus, alcohol, & tomato products.  - Advised to avoid/limit use of NSAID's, since they can cause GI upset, bleeding, and/or ulcers. If needed, take with food.     Insomnia:  Restart melatonin and over-the-counter sleep aids.  If no improvement would benefit from in house sleep study.  Reports occasional snoring.    Complete history and physical was completed today.  Complete and thorough medication reconciliation was performed.  Discussed risks and benefits of medications.  Advised patient on orders and health maintenance.  We discussed old records and old labs if available.  Will request any records not available through epic.  Continue current medications listed on your summary sheet.    All questions were answered. Patient had no further concerns. Advised of diagnoses and plan. Follow up as planned or return sooner if symptoms persist or worsen.     Orders Placed This Encounter   Procedures    Influenza - Quadrivalent (PF)    Lipid Panel     Standing Status:   Future     Number of Occurrences:   1     Standing Expiration Date:   12/15/2021    Hemoglobin A1C     Standing Status:   Future     Number of Occurrences:   1     Standing Expiration Date:   12/15/2021    CBC Without Differential     Standing Status:   Future     Number of Occurrences:   1     Standing Expiration Date:   12/15/2021    Comprehensive Metabolic Panel     Standing Status:   Future     Number of Occurrences:   1     Standing Expiration Date:   12/15/2021    TSH     Standing Status:   Future     Number of Occurrences:   1     Standing Expiration Date:   12/15/2021    PSA, Screening      Standing Status:   Future     Number of Occurrences:   1     Standing Expiration Date:   12/15/2021    T3, Free     Standing Status:   Future     Number of Occurrences:   1     Standing Expiration Date:   12/15/2021    T4, Free     Standing Status:   Future     Number of Occurrences:   1     Standing Expiration Date:   12/15/2021             Chato Franklin MD

## 2020-11-10 ENCOUNTER — PATIENT OUTREACH (OUTPATIENT)
Dept: ADMINISTRATIVE | Facility: HOSPITAL | Age: 50
End: 2020-11-10

## 2021-01-06 LAB
ALBUMIN SERPL-MCNC: 4.7 G/DL (ref 3.6–5.1)
ALBUMIN/GLOB SERPL: 1.9 (CALC) (ref 1–2.5)
ALP SERPL-CCNC: 65 U/L (ref 36–130)
ALT SERPL-CCNC: 34 U/L (ref 9–46)
AST SERPL-CCNC: 24 U/L (ref 10–40)
BILIRUB SERPL-MCNC: 1 MG/DL (ref 0.2–1.2)
BUN SERPL-MCNC: 20 MG/DL (ref 7–25)
BUN/CREAT SERPL: NORMAL (CALC) (ref 6–22)
CALCIUM SERPL-MCNC: 9.8 MG/DL (ref 8.6–10.3)
CHLORIDE SERPL-SCNC: 103 MMOL/L (ref 98–110)
CHOLEST SERPL-MCNC: 224 MG/DL
CHOLEST/HDLC SERPL: 3.6 (CALC)
CO2 SERPL-SCNC: 31 MMOL/L (ref 20–32)
CREAT SERPL-MCNC: 0.9 MG/DL (ref 0.6–1.35)
ERYTHROCYTE [DISTWIDTH] IN BLOOD BY AUTOMATED COUNT: 12.5 % (ref 11–15)
GFRSERPLBLD MDRD-ARVRAT: 100 ML/MIN/1.73M2
GLOBULIN SER CALC-MCNC: 2.5 G/DL (CALC) (ref 1.9–3.7)
GLUCOSE SERPL-MCNC: 86 MG/DL (ref 65–99)
HBA1C MFR BLD: 4.8 % OF TOTAL HGB
HCT VFR BLD AUTO: 47.6 % (ref 38.5–50)
HDLC SERPL-MCNC: 62 MG/DL
HGB BLD-MCNC: 15.7 G/DL (ref 13.2–17.1)
LDLC SERPL CALC-MCNC: 147 MG/DL (CALC)
MCH RBC QN AUTO: 30.1 PG (ref 27–33)
MCHC RBC AUTO-ENTMCNC: 33 G/DL (ref 32–36)
MCV RBC AUTO: 91.4 FL (ref 80–100)
NONHDLC SERPL-MCNC: 162 MG/DL (CALC)
PLATELET # BLD AUTO: 237 THOUSAND/UL (ref 140–400)
PMV BLD REES-ECKER: 10.1 FL (ref 7.5–12.5)
POTASSIUM SERPL-SCNC: 3.9 MMOL/L (ref 3.5–5.3)
PROT SERPL-MCNC: 7.2 G/DL (ref 6.1–8.1)
PSA SERPL-MCNC: 0.8 NG/ML
RBC # BLD AUTO: 5.21 MILLION/UL (ref 4.2–5.8)
SARS-COV-2 IGG SERPL QL IA: POSITIVE
SODIUM SERPL-SCNC: 142 MMOL/L (ref 135–146)
T3FREE SERPL-MCNC: 3.5 PG/ML (ref 2.3–4.2)
T4 FREE SERPL-MCNC: 1 NG/DL (ref 0.8–1.8)
TRIGL SERPL-MCNC: 55 MG/DL
TSH SERPL-ACNC: 0.73 MIU/L (ref 0.4–4.5)
WBC # BLD AUTO: 6.2 THOUSAND/UL (ref 3.8–10.8)

## 2021-01-08 NOTE — PROGRESS NOTES
Please CALL patient with results and Document verification.   [phone]    COVID antibody test is positive.  Therefore there has been an immune response to COVID.  I do recommend that you proceed with COVID vaccine once available to you.

## 2021-02-02 ENCOUNTER — PATIENT MESSAGE (OUTPATIENT)
Dept: FAMILY MEDICINE | Facility: CLINIC | Age: 51
End: 2021-02-02

## 2021-06-16 DIAGNOSIS — E04.1 THYROID NODULE: ICD-10-CM

## 2021-06-16 DIAGNOSIS — Z79.899 ENCOUNTER FOR LONG-TERM (CURRENT) USE OF MEDICATIONS: Primary | ICD-10-CM

## 2021-06-16 RX ORDER — PANTOPRAZOLE SODIUM 40 MG/1
40 TABLET, DELAYED RELEASE ORAL DAILY
Qty: 90 TABLET | Refills: 4 | Status: SHIPPED | OUTPATIENT
Start: 2021-06-16

## 2022-05-16 ENCOUNTER — TELEPHONE (OUTPATIENT)
Dept: FAMILY MEDICINE | Facility: CLINIC | Age: 52
End: 2022-05-16
Payer: COMMERCIAL

## 2022-05-16 NOTE — TELEPHONE ENCOUNTER
Pt states he has his lab work completed at [facility] in March. It was ordered by Dr Eliu Almonte. Have you received these results? He would like you to review them please.

## 2022-05-16 NOTE — TELEPHONE ENCOUNTER
----- Message from Laura Salvador sent at 5/16/2022  9:46 AM CDT -----  Pt is needing a call back regarding blood work scheduled for tomorrow. Please call back at [phone]

## 2022-05-24 ENCOUNTER — OFFICE VISIT (OUTPATIENT)
Dept: FAMILY MEDICINE | Facility: CLINIC | Age: 52
End: 2022-05-24
Payer: COMMERCIAL

## 2022-05-24 VITALS
RESPIRATION RATE: 16 BRPM | WEIGHT: 161.81 LBS | TEMPERATURE: 98 F | HEART RATE: 60 BPM | HEIGHT: 69 IN | SYSTOLIC BLOOD PRESSURE: 104 MMHG | BODY MASS INDEX: 23.97 KG/M2 | OXYGEN SATURATION: 98 % | DIASTOLIC BLOOD PRESSURE: 72 MMHG

## 2022-05-24 DIAGNOSIS — G47.00 INSOMNIA, UNSPECIFIED TYPE: ICD-10-CM

## 2022-05-24 DIAGNOSIS — Z13.6 ENCOUNTER FOR LIPID SCREENING FOR CARDIOVASCULAR DISEASE: ICD-10-CM

## 2022-05-24 DIAGNOSIS — Z13.220 ENCOUNTER FOR LIPID SCREENING FOR CARDIOVASCULAR DISEASE: ICD-10-CM

## 2022-05-24 DIAGNOSIS — E04.1 THYROID NODULE: ICD-10-CM

## 2022-05-24 DIAGNOSIS — Z79.899 ENCOUNTER FOR LONG-TERM (CURRENT) USE OF MEDICATIONS: ICD-10-CM

## 2022-05-24 DIAGNOSIS — Z00.00 WELL ADULT EXAM: Primary | ICD-10-CM

## 2022-05-24 DIAGNOSIS — K21.00 GASTROESOPHAGEAL REFLUX DISEASE WITH ESOPHAGITIS WITHOUT HEMORRHAGE: ICD-10-CM

## 2022-05-24 PROBLEM — L72.3 SEBACEOUS CYST: Status: ACTIVE | Noted: 2021-10-28

## 2022-05-24 PROCEDURE — 3074F PR MOST RECENT SYSTOLIC BLOOD PRESSURE < 130 MM HG: ICD-10-PCS | Mod: CPTII,S$GLB,, | Performed by: FAMILY MEDICINE

## 2022-05-24 PROCEDURE — 3074F SYST BP LT 130 MM HG: CPT | Mod: CPTII,S$GLB,, | Performed by: FAMILY MEDICINE

## 2022-05-24 PROCEDURE — 1160F RVW MEDS BY RX/DR IN RCRD: CPT | Mod: CPTII,S$GLB,, | Performed by: FAMILY MEDICINE

## 2022-05-24 PROCEDURE — 99999 PR PBB SHADOW E&M-EST. PATIENT-LVL IV: CPT | Mod: PBBFAC,,, | Performed by: FAMILY MEDICINE

## 2022-05-24 PROCEDURE — 99999 PR PBB SHADOW E&M-EST. PATIENT-LVL IV: ICD-10-PCS | Mod: PBBFAC,,, | Performed by: FAMILY MEDICINE

## 2022-05-24 PROCEDURE — 99396 PR PREVENTIVE VISIT,EST,40-64: ICD-10-PCS | Mod: S$GLB,,, | Performed by: FAMILY MEDICINE

## 2022-05-24 PROCEDURE — 3078F DIAST BP <80 MM HG: CPT | Mod: CPTII,S$GLB,, | Performed by: FAMILY MEDICINE

## 2022-05-24 PROCEDURE — 1159F PR MEDICATION LIST DOCUMENTED IN MEDICAL RECORD: ICD-10-PCS | Mod: CPTII,S$GLB,, | Performed by: FAMILY MEDICINE

## 2022-05-24 PROCEDURE — 3008F PR BODY MASS INDEX (BMI) DOCUMENTED: ICD-10-PCS | Mod: CPTII,S$GLB,, | Performed by: FAMILY MEDICINE

## 2022-05-24 PROCEDURE — 1159F MED LIST DOCD IN RCRD: CPT | Mod: CPTII,S$GLB,, | Performed by: FAMILY MEDICINE

## 2022-05-24 PROCEDURE — 1160F PR REVIEW ALL MEDS BY PRESCRIBER/CLIN PHARMACIST DOCUMENTED: ICD-10-PCS | Mod: CPTII,S$GLB,, | Performed by: FAMILY MEDICINE

## 2022-05-24 PROCEDURE — 99396 PREV VISIT EST AGE 40-64: CPT | Mod: S$GLB,,, | Performed by: FAMILY MEDICINE

## 2022-05-24 PROCEDURE — 3078F PR MOST RECENT DIASTOLIC BLOOD PRESSURE < 80 MM HG: ICD-10-PCS | Mod: CPTII,S$GLB,, | Performed by: FAMILY MEDICINE

## 2022-05-24 PROCEDURE — 3008F BODY MASS INDEX DOCD: CPT | Mod: CPTII,S$GLB,, | Performed by: FAMILY MEDICINE

## 2022-05-24 RX ORDER — FAMOTIDINE 40 MG/1
40 TABLET, FILM COATED ORAL DAILY
Qty: 30 TABLET | Refills: 11 | Status: SHIPPED | OUTPATIENT
Start: 2022-05-24 | End: 2023-05-24

## 2022-05-24 NOTE — PATIENT INSTRUCTIONS
Follow up in about 1 year (around 5/24/2023) for annual.     Dear patient,   As a result of recent federal legislation (The Federal Cures Act), you may receive lab or pathology results from your visit in your [facility] account before your physician is able to contact you. Your physician or their representative will relay the results to you with their recommendations at their soonest availability.     If no improvement in symptoms or symptoms worsen, please be advised to call MD, follow-up at clinic and/or go to ER if becomes severe.    Chato Franklin M.D.        We Offer TELEHEALTH & Same Day Appointments!   Book your Telehealth appointment with me through my nurse or   Clinic appointments on [email or web address]!    [address] [place] [address]    Office: [phone]   FAX: [phone]    Check out my Facebook Page and Follow Me at: [email or web address]    Check out my website at [employer] by clicking on: [email or web address]    To Schedule appointments online, go to [email or web address]: [email or web address]

## 2022-05-24 NOTE — PROGRESS NOTES
This note is specifically for wellness visit performed today.     WELLNESS EXAM      Patient ID: Fortino Castro is a 51 y.o. male.  has a past medical history of GERD (gastroesophageal reflux disease) and Thyroid nodule (12/7/2019).     Chief Complaint:  Encounter for wellness exam    Well Adult Physical: Patient here for a comprehensive physical exam.The patient reports chronic problems.  The patient's last visit with me was on 10/16/2020.    Reviewed care team:   ENDO- Dr. Eliu Almonte   URO- Aj Acuna MD- last testosterone was at lower limits   ENT - Dr. Mcnally - had home sleep study that was inconclusive.  In-house sleep study was ordered but was denied by insurance.  GI Dr. Recinos - EGD at [facility]     May 2022:  Patient reports that he continues to have excessive daytime sleepiness/fatigue in the evenings.  Patient reports that he still does not sleep well at night.  He has tried melatonin but not consistently.  He continues to have reflux and has tried to stop Protonix in the past.  He continues to follow with Dr. Almonte for thyroid nodule.  Slight enlargement of the right-sided thyroid nodules.  Scanned outside labs and ultrasound report.      He has a history of a right thyroid nodule for which he is followed by Dr. Almonte.  He receives annual ultrasounds by Dr. Almonte. He has had 2 FNAs performed in the past which were benign.      2020: Patient reports that he has recovered from COVID recently in the last 4 to 6 weeks.    Do you take any herbs or supplements that were not prescribed by a doctor?  Yes patient takes supplements  Are you taking calcium supplements?  Multivitamin    Lab Results   Component Value Date    WBC 6.2 01/05/2021    HGB 15.7 01/05/2021    HCT 47.6 01/05/2021    MCV 91.4 01/05/2021     01/05/2021       No results found for: IRON, TIBC, FERRITIN, SATURATEDIRO  No results found for: [id number]  No results found for: FOLATE       History:  Chronic prostatitis,  hypotestosteronism followed by Dr. Acuna.  Patient is on DHEA:     Date last PSA:  Requesting records  The natural history of prostate cancer and ongoing controversy regarding screening and potential treatment outcomes of prostate cancer has been discussed with the patient. The meaning of a false positive PSA and a false negative PSA has been discussed. He indicates understanding of the limitations of this screening test and wishes to proceed with screening PSA testing.    No results found for: PSA   Colon cancer screening:  Patient is due.  We discussed different screening modalities.  Patient agrees to proceed with colonoscopy.  He will follow up with his gastroenterologist.    Health Maintenance Topics with due status: Not Due       Topic Last Completion Date    Lipid Panel 01/05/2021    COVID-19 Vaccine 04/19/2022        ==============================================  History reviewed.     Health Maintenance Due   Topic Date Due    Colorectal Cancer Screening  Never done    TETANUS VACCINE  01/01/2021    Shingles Vaccine (1 of 2) Never done       Past Medical History:  Past Medical History:   Diagnosis Date    GERD (gastroesophageal reflux disease)     Thyroid nodule 12/7/2019     Past Surgical History:   Procedure Laterality Date    HERNIA REPAIR       Review of patient's allergies indicates:   Allergen Reactions    Clarithromycin Palpitations    Sulfamethoxazole-trimethoprim Hives and Itching     Current Outpatient Medications on File Prior to Visit   Medication Sig Dispense Refill    acetaminophen (TYLENOL) 500 MG tablet Take 2 tablets (1,000 mg total) by mouth every 6 (six) hours as needed for Pain or Temperature greater than (MAX of 4 g daily).  0    biotin 1 mg Cap Take by mouth.      cholecalciferol, vitamin D3, (VITAMIN D3) 25 mcg (1,000 unit) capsule Take 1,000 Units by mouth.      pantoprazole (PROTONIX) 40 MG tablet Take 1 tablet (40 mg total) by mouth once daily. Okay to take every other  day if needed. 90 tablet 4    prasterone, dhea, 25 mg Cap Take 1 capsule by mouth.       No current facility-administered medications on file prior to visit.     Social History     Socioeconomic History    Marital status:    Tobacco Use    Smoking status: Never Smoker    Smokeless tobacco: Former User   Substance and Sexual Activity    Alcohol use: Not Currently    Drug use: Never    Sexual activity: Yes     Partners: Female     Family History   Problem Relation Age of Onset    Arrhythmia Mother     Alzheimer's disease Father        Vitals:    05/24/22 1105   BP: 104/72   Pulse: 60   Resp: 16   Temp: 97.5 °F (36.4 °C)      Body mass index is 23.9 kg/m².     Review of Systems   Constitutional: Negative for chills, fatigue, fever and unexpected weight change.   HENT: Negative for ear pain and sore throat.    Eyes: Negative for redness and visual disturbance.   Respiratory: Negative for cough and shortness of breath.    Cardiovascular: Negative for chest pain and palpitations.   Gastrointestinal: Negative for nausea and vomiting.   Endocrine: Negative for cold intolerance and heat intolerance.   Genitourinary: Negative for difficulty urinating and hematuria.   Musculoskeletal: Negative for arthralgias and myalgias.   Skin: Negative for rash and wound.   Allergic/Immunologic: Negative for environmental allergies and food allergies.   Neurological: Negative for weakness and headaches.   Hematological: Negative for adenopathy. Does not bruise/bleed easily.   Psychiatric/Behavioral: Positive for sleep disturbance. The patient is not nervous/anxious.           Objective:      Physical Exam  Vitals and nursing note reviewed.   Constitutional:       General: He is not in acute distress.     Appearance: He is well-developed.   HENT:      Head: Normocephalic and atraumatic.   Eyes:      Pupils: Pupils are equal, round, and reactive to light.   Cardiovascular:      Rate and Rhythm: Normal rate and regular rhythm.       Heart sounds: Normal heart sounds. No murmur heard.  Pulmonary:      Effort: Pulmonary effort is normal. No respiratory distress.      Breath sounds: Normal breath sounds. No wheezing.   Musculoskeletal:         General: Normal range of motion.      Cervical back: Normal range of motion and neck supple.   Skin:     General: Skin is warm and dry.      Capillary Refill: Capillary refill takes less than 2 seconds.   Neurological:      Mental Status: He is alert and oriented to person, place, and time.      Cranial Nerves: No cranial nerve deficit.   Psychiatric:         Behavior: Behavior normal.          Assessment / Plan:      1.  Routine health exam-patient here for annual wellness exam.  Labs ordered.  Health maintenance was reviewed and ordered.    Insomnia/daytime fatigue:  Follow-up with ENT for sleep study and further evaluation.  Start melatonin at bedtime.Discussed insomnia condition course.  Advised of first-line medications for this condition.  Also discussed sleep hygiene.  Information was given below.  Good sleep habits (sometimes referred to as sleep hygiene) can help you get a good nights sleep.    Some habits that can improve your sleep health:  -Be consistent. Go to bed at the same time each night and get up at the same time each morning, including on the weekends  -Make sure your bedroom is quiet, dark, relaxing, and at a comfortable temperature  -Remove electronic devices, such as TVs, computers, and smart phones, from the bedroom  -Avoid large meals, caffeine, and alcohol before bedtime  -Get some exercise. Being physically active during the day can help you fall asleep more easily at night.    Thyroid nodules:  Reviewed recent labs and thyroid ultrasound.  Patient is concerned about the nodules that are increasing in size.  Patient was told by ENT that he needs to have the nodules removed.  He is going to have a 2nd opinion by Endocrinology at [facility].      Reflux:  Add Pepcid 40 milligrams  daily.  Continue Protonix 40 daily. GERD RECOMMENDATIONS  caffeine reduction dietary changes elevate HOB NPO after supper  - Take PPI in the morning 30-60 minutes before breakfast  - Educated patient on lifestyle modifications to help control/reduce reflux/abdominal pain including: avoid large meals, avoid eating within 2-3 hours of bedtime (avoid late night eating & lying down soon after eating), elevate head of bed if nocturnal symptoms are present, smoking cessation (if current smoker), & weight loss (if overweight).   - Educated to avoid known foods which trigger reflux symptoms & to minimize/avoid high-fat foods, chocolate, caffeine, citrus, alcohol, & tomato products.  - Advised to avoid/limit use of NSAID's, since they can cause GI upset, bleeding, and/or ulcers. If needed, take with food.     Insomnia:  Restart melatonin and over-the-counter sleep aids.  If no improvement would benefit from in house sleep study.  Reports occasional snoring.    Complete history and physical was completed today.  Complete and thorough medication reconciliation was performed.  Discussed risks and benefits of medications.  Advised patient on orders and health maintenance.  We discussed old records and old labs if available.  Will request any records not available through epic.  Continue current medications listed on your summary sheet.    All questions were answered. Patient had no further concerns. Advised of diagnoses and plan. Follow up as planned or return sooner if symptoms persist or worsen.     No orders of the defined types were placed in this encounter.      Medications Ordered This Encounter   Medications    famotidine (PEPCID) 40 MG tablet     Sig: Take 1 tablet (40 mg total) by mouth once daily.     Dispense:  30 tablet     Refill:  11      Future Appointments     Date Provider Specialty Appt Notes    5/24/2023 Chato Franklin MD Family Medicine annual          Chato Franklin MD

## 2022-05-30 ENCOUNTER — PATIENT MESSAGE (OUTPATIENT)
Dept: ADMINISTRATIVE | Facility: HOSPITAL | Age: 52
End: 2022-05-30
Payer: COMMERCIAL

## 2022-05-31 ENCOUNTER — PATIENT MESSAGE (OUTPATIENT)
Dept: FAMILY MEDICINE | Facility: CLINIC | Age: 52
End: 2022-05-31
Payer: COMMERCIAL

## 2022-06-16 ENCOUNTER — TELEPHONE (OUTPATIENT)
Dept: FAMILY MEDICINE | Facility: CLINIC | Age: 52
End: 2022-06-16
Payer: COMMERCIAL

## 2022-06-16 NOTE — TELEPHONE ENCOUNTER
----- Message from Ashley Cantu sent at 6/16/2022  9:52 AM CDT -----  Regarding: questions  Contact: [phone]  Pt Questions      Questions: Pt calling to speak with the dr about the reaction from the medication that was prescribe for him   Call Back number: [phone]

## 2022-06-16 NOTE — TELEPHONE ENCOUNTER
Patient was prescribed Famotidine 40 mg and since he was taking it he has been feeling bloated, nauseous and anxious.

## 2022-07-30 ENCOUNTER — PATIENT MESSAGE (OUTPATIENT)
Dept: FAMILY MEDICINE | Facility: CLINIC | Age: 52
End: 2022-07-30
Payer: COMMERCIAL

## 2022-12-12 LAB — CRC RECOMMENDATION EXT: NORMAL

## 2022-12-20 DIAGNOSIS — Z12.31 OTHER SCREENING MAMMOGRAM: ICD-10-CM

## 2023-01-03 ENCOUNTER — PATIENT OUTREACH (OUTPATIENT)
Dept: ADMINISTRATIVE | Facility: HOSPITAL | Age: 53
End: 2023-01-03
Payer: COMMERCIAL

## 2023-03-16 LAB
ALBUMIN/GLOBULIN RATIO: 1.9 (ref 1–2.5)
ALBUMIN: 4.1 (ref 3.6–5.1)
ALP SERPL-CCNC: 55 U/L (ref 37–153)
ALT SERPL W P-5'-P-CCNC: 27 U/L (ref 6–29)
AST SERPL-CCNC: 21 U/L (ref 10–35)
BILIRUB SERPL-MCNC: 0.8 MG/DL (ref 0.2–1.2)
BUN SERPL-MCNC: 16 MG/DL (ref 7–25)
BUN/CREAT SERPL: 15 (ref 6–22)
CALCIUM SERPL-MCNC: 9.4 MG/DL (ref 8.6–10.4)
CHLORIDE SERPL-SCNC: 106 MMOL/L (ref 98–110)
CHOLEST SERPL-MSCNC: 185 MG/DL
CO2 SERPL-SCNC: 27 MMOL/L (ref 20–32)
CREAT SERPL-MCNC: 1.1 MG/DL (ref 0.5–1)
EGFR: 64
GLOBULIN: 2.2 (ref 1.9–3.7)
GLUCOSE SERPL-MCNC: 88 MG/DL (ref 65–99)
HDLC SERPL-MCNC: 48 MG/DL
LDLC SERPL CALC-MCNC: 117 MG/DL
NON HDL CHOL. (LDL+VLDL): 137
POTASSIUM SERPL-SCNC: 4 MMOL/L (ref 3.5–5.3)
PROT SNV-MCNC: 6.3 G/L (ref 6.1–8.1)
SODIUM BLD-SCNC: 141 MMOL/L (ref 135–146)
TRIGL SERPL-MCNC: 97 MG/DL

## 2023-05-24 ENCOUNTER — OFFICE VISIT (OUTPATIENT)
Dept: FAMILY MEDICINE | Facility: CLINIC | Age: 53
End: 2023-05-24
Payer: COMMERCIAL

## 2023-05-24 VITALS
OXYGEN SATURATION: 98 % | SYSTOLIC BLOOD PRESSURE: 110 MMHG | HEART RATE: 59 BPM | HEIGHT: 70 IN | DIASTOLIC BLOOD PRESSURE: 60 MMHG | BODY MASS INDEX: 23.51 KG/M2 | WEIGHT: 164.19 LBS

## 2023-05-24 DIAGNOSIS — Z00.00 WELL ADULT EXAM: Primary | ICD-10-CM

## 2023-05-24 DIAGNOSIS — Z13.6 ENCOUNTER FOR LIPID SCREENING FOR CARDIOVASCULAR DISEASE: ICD-10-CM

## 2023-05-24 DIAGNOSIS — L98.9 SKIN LESION: ICD-10-CM

## 2023-05-24 DIAGNOSIS — Z13.220 ENCOUNTER FOR LIPID SCREENING FOR CARDIOVASCULAR DISEASE: ICD-10-CM

## 2023-05-24 DIAGNOSIS — J34.89 RHINORRHEA: ICD-10-CM

## 2023-05-24 DIAGNOSIS — Z79.899 ENCOUNTER FOR LONG-TERM (CURRENT) USE OF MEDICATIONS: ICD-10-CM

## 2023-05-24 LAB
CTP QC/QA: YES
CTP QC/QA: YES
POC MOLECULAR INFLUENZA A AGN: NEGATIVE
POC MOLECULAR INFLUENZA B AGN: NEGATIVE
SARS-COV-2 RDRP RESP QL NAA+PROBE: NEGATIVE

## 2023-05-24 PROCEDURE — 3074F SYST BP LT 130 MM HG: CPT | Mod: CPTII,S$GLB,, | Performed by: FAMILY MEDICINE

## 2023-05-24 PROCEDURE — 99999 PR PBB SHADOW E&M-EST. PATIENT-LVL IV: CPT | Mod: PBBFAC,,, | Performed by: FAMILY MEDICINE

## 2023-05-24 PROCEDURE — 1160F RVW MEDS BY RX/DR IN RCRD: CPT | Mod: CPTII,S$GLB,, | Performed by: FAMILY MEDICINE

## 2023-05-24 PROCEDURE — 99396 PR PREVENTIVE VISIT,EST,40-64: ICD-10-PCS | Mod: S$GLB,,, | Performed by: FAMILY MEDICINE

## 2023-05-24 PROCEDURE — 87635: ICD-10-PCS | Mod: QW,S$GLB,, | Performed by: FAMILY MEDICINE

## 2023-05-24 PROCEDURE — 3078F PR MOST RECENT DIASTOLIC BLOOD PRESSURE < 80 MM HG: ICD-10-PCS | Mod: CPTII,S$GLB,, | Performed by: FAMILY MEDICINE

## 2023-05-24 PROCEDURE — 1159F MED LIST DOCD IN RCRD: CPT | Mod: CPTII,S$GLB,, | Performed by: FAMILY MEDICINE

## 2023-05-24 PROCEDURE — 87635 SARS-COV-2 COVID-19 AMP PRB: CPT | Mod: QW,S$GLB,, | Performed by: FAMILY MEDICINE

## 2023-05-24 PROCEDURE — 3074F PR MOST RECENT SYSTOLIC BLOOD PRESSURE < 130 MM HG: ICD-10-PCS | Mod: CPTII,S$GLB,, | Performed by: FAMILY MEDICINE

## 2023-05-24 PROCEDURE — 3008F PR BODY MASS INDEX (BMI) DOCUMENTED: ICD-10-PCS | Mod: CPTII,S$GLB,, | Performed by: FAMILY MEDICINE

## 2023-05-24 PROCEDURE — 87502 INFLUENZA DNA AMP PROBE: CPT | Mod: QW,S$GLB,, | Performed by: FAMILY MEDICINE

## 2023-05-24 PROCEDURE — 99396 PREV VISIT EST AGE 40-64: CPT | Mod: S$GLB,,, | Performed by: FAMILY MEDICINE

## 2023-05-24 PROCEDURE — 99999 PR PBB SHADOW E&M-EST. PATIENT-LVL IV: ICD-10-PCS | Mod: PBBFAC,,, | Performed by: FAMILY MEDICINE

## 2023-05-24 PROCEDURE — 3078F DIAST BP <80 MM HG: CPT | Mod: CPTII,S$GLB,, | Performed by: FAMILY MEDICINE

## 2023-05-24 PROCEDURE — 3008F BODY MASS INDEX DOCD: CPT | Mod: CPTII,S$GLB,, | Performed by: FAMILY MEDICINE

## 2023-05-24 PROCEDURE — 1159F PR MEDICATION LIST DOCUMENTED IN MEDICAL RECORD: ICD-10-PCS | Mod: CPTII,S$GLB,, | Performed by: FAMILY MEDICINE

## 2023-05-24 PROCEDURE — 87502 POCT INFLUENZA A/B MOLECULAR: ICD-10-PCS | Mod: QW,S$GLB,, | Performed by: FAMILY MEDICINE

## 2023-05-24 PROCEDURE — 1160F PR REVIEW ALL MEDS BY PRESCRIBER/CLIN PHARMACIST DOCUMENTED: ICD-10-PCS | Mod: CPTII,S$GLB,, | Performed by: FAMILY MEDICINE

## 2023-05-24 RX ORDER — PREDNISONE 5 MG/1
5 TABLET ORAL DAILY
Qty: 5 TABLET | Refills: 0 | Status: SHIPPED | OUTPATIENT
Start: 2023-05-24 | End: 2023-05-29

## 2023-05-24 RX ORDER — CETIRIZINE HYDROCHLORIDE 10 MG/1
10 TABLET ORAL DAILY PRN
COMMUNITY

## 2023-05-24 RX ORDER — LANOLIN ALCOHOL/MO/W.PET/CERES
1000 CREAM (GRAM) TOPICAL
COMMUNITY

## 2023-05-24 RX ORDER — PREDNISONE 20 MG/1
20 TABLET ORAL DAILY
Qty: 5 TABLET | Refills: 0 | Status: SHIPPED | OUTPATIENT
Start: 2023-05-24 | End: 2023-05-24

## 2023-05-24 RX ORDER — ALFUZOSIN HYDROCHLORIDE 10 MG/1
10 TABLET, EXTENDED RELEASE ORAL
COMMUNITY
Start: 2023-05-01

## 2023-05-24 NOTE — PROGRESS NOTES
Please call patient [phone] to notify of COVID negative screening test.   Check on the patient to see if they are having any symptoms.  Set up virtual visit or office visit if needing further evaluation and treatment with  Me or an available provider. [phone]

## 2023-05-24 NOTE — PATIENT INSTRUCTIONS
Follow up in about 1 year (around 5/24/2024), or if symptoms worsen or fail to improve, for Annual Wellness Exam.     Dear patient,   As a result of recent federal legislation (The Federal Cures Act), you may receive lab or pathology results from your visit in your [facility] account before your physician is able to contact you. Your physician or their representative will relay the results to you with their recommendations at their soonest availability.     If no improvement in symptoms or symptoms worsen, please be advised to call MD, follow-up at clinic and/or go to ER if becomes severe.    Chato Franklin M.D.        We Offer TELEHEALTH & Same Day Appointments!   Book your Telehealth appointment with me through my nurse or   Clinic appointments on [facility]!    [address] [place] [address]    Office: [phone]   FAX: [phone]    Check out my Facebook Page and Follow Me at: [email or web address]    Check out my website at [employer] by clicking on: [email or web address]    To Schedule appointments online, go to [facility]: [email or web address]

## 2023-05-24 NOTE — PROGRESS NOTES
This note is specifically for wellness visit performed today.     WELLNESS EXAM      Patient ID: Fortino Castro is a 52 y.o. male.  has a past medical history of GERD (gastroesophageal reflux disease) and Thyroid nodule (12/7/2019).     Chief Complaint:  Encounter for wellness exam    Well Adult Physical: Patient here for a comprehensive physical exam.The patient reports chronic problems.  The patient's last visit with me was on 5/24/2022.    Reviewed care team:   ENDO- Dr. Eliu Almonte ,  FNA thyroid pending with Dr. Cat at [facility]   URO- jA Acuna MD- last testosterone was at lower limits,   ENT - Dr. Mcnally - had home sleep study that was inconclusive.  In-house sleep study was ordered but was denied by insurance.  GI Dr. Recinos - EGD at [facility]     May 2023:  Patient has lab orders were performed by Endocrinology.  Patient has FNA pending through Endocrinology also.  We reviewed those labs.  No significant concerns at this time.  Patient is having some upper respiratory symptoms with rhinorrhea.  Patient has procedure on Friday on like to be ruled out for flu and COVID which was performed today.  Both of those are negative.  Patient cannot take many medications due to his chronic prostatitis.  Patient did have an ultrasound performed at [place].  We reviewed this also.  He is now on Uroxatral.  He is also concerned about some skin lesions on his hands.  Patient has been seeing [facility] but would like to have a 2nd opinion here in [place].                    May 2022:  Patient reports that he continues to have excessive daytime sleepiness/fatigue in the evenings.  Patient reports that he still does not sleep well at night.  He has tried melatonin but not consistently.  He continues to have reflux and has tried to stop Protonix in the past.  He continues to follow with Dr. Almonte for thyroid nodule.  Slight enlargement of the right-sided thyroid nodules.  Scanned outside labs and ultrasound  report.      He has a history of a right thyroid nodule for which he is followed by Dr. Almonte.  He receives annual ultrasounds by Dr. Almonte. He has had 2 FNAs performed in the past which were benign.      2020: Patient reports that he has recovered from COVID recently in the last 4 to 6 weeks.    Do you take any herbs or supplements that were not prescribed by a doctor?  Yes patient takes supplements  Are you taking calcium supplements?  Multivitamin    Lab Results   Component Value Date    WBC 6.2 01/05/2021    HGB 15.7 01/05/2021    HCT 47.6 01/05/2021    MCV 91.4 01/05/2021     01/05/2021       No results found for: IRON, TIBC, FERRITIN, SATURATEDIRO  No results found for: WOBNCLQL02  No results found for: FOLATE       History:  Chronic prostatitis, hypotestosteronism followed by Dr. Acuna.  Patient is on DHEA:   REASON FOR EXAM: [N41.9]-Inflammatory disease of prostate, unspecified / [M54.9]-Dorsalgia, unspecified / [R10.30]-Lower abdominal pain, unspecified     TECHNICAL FACTORS: B-mode and Doppler sonographic images were obtained of the kidneys and urinary bladder.     COMPARISON: None. Correlation with renal stone CT 9/2/2009     FINDINGS:   The kidneys are normal in echogenicity. The right kidney measures 11.7 cm, and the left kidney measures 11.3 cm. There is no evidence of renal mass, hydronephrosis, or nephrolithiasis.  The prostate is enlarged measuring 4.8 x 3.8 x 4.1 cm. There are   multiple coarse calcifications within the prostate. Per the technologist, the patient had voided immediately prior to examination.  Post void urinary bladder dimensions of 7.0 x 8.5 x 8.4 cm for a volume of 263 cc.       IMPRESSION:    1.  No evidence of obstructive uropathy.    2.  Prostatomegaly. Post void urinary bladder volume of 263 cc. Correlate for urinary retention.         Electronically signed by Adilson Willis MD on 4/28/2023 8:00 AM  Date last PSA:  Requesting records  The natural history of prostate  cancer and ongoing controversy regarding screening and potential treatment outcomes of prostate cancer has been discussed with the patient. The meaning of a false positive PSA and a false negative PSA has been discussed. He indicates understanding of the limitations of this screening test and wishes to proceed with screening PSA testing.    No results found for: PSA   Colon cancer screening:  UTD    Health Maintenance Topics with due status: Not Due       Topic Last Completion Date    Colorectal Cancer Screening 12/12/2022    Lipid Panel 03/16/2023        ==============================================  History reviewed.     There are no preventive care reminders to display for this patient.      Past Medical History:  Past Medical History:   Diagnosis Date    GERD (gastroesophageal reflux disease)     Thyroid nodule 12/7/2019     Past Surgical History:   Procedure Laterality Date    HERNIA REPAIR       Review of patient's allergies indicates:   Allergen Reactions    Clarithromycin Palpitations    Sulfa (sulfonamide antibiotics) Hives    Sulfamethoxazole-trimethoprim Hives and Itching     Current Outpatient Medications on File Prior to Visit   Medication Sig Dispense Refill    acetaminophen (TYLENOL) 500 MG tablet Take 2 tablets (1,000 mg total) by mouth every 6 (six) hours as needed for Pain or Temperature greater than (MAX of 4 g daily).  0    alfuzosin (UROXATRAL) 10 mg Tb24 Take 10 mg by mouth.      cetirizine (ZYRTEC) 10 MG tablet Take 10 mg by mouth daily as needed.      cholecalciferol, vitamin D3, (VITAMIN D3) 25 mcg (1,000 unit) capsule Take 1,000 Units by mouth.      cyanocobalamin (VITAMIN B-12) 1000 MCG tablet Take 1,000 mcg by mouth.      famotidine (PEPCID) 40 MG tablet Take 1 tablet (40 mg total) by mouth once daily. 30 tablet 11    pantoprazole (PROTONIX) 40 MG tablet Take 1 tablet (40 mg total) by mouth once daily. Okay to take every other day if needed. 90 tablet 4    prasterone, dhea, 25 mg Cap  Take 1 capsule by mouth.      biotin 1 mg Cap Take by mouth.       No current facility-administered medications on file prior to visit.     Social History     Socioeconomic History    Marital status:    Tobacco Use    Smoking status: Never    Smokeless tobacco: Former   Substance and Sexual Activity    Alcohol use: Not Currently    Drug use: Never    Sexual activity: Yes     Partners: Female     Family History   Problem Relation Age of Onset    Arrhythmia Mother     Alzheimer's disease Father        Vitals:    05/24/23 1027   BP: 110/60   Pulse: (!) 59      Body mass index is 23.56 kg/m².     Review of Systems   Constitutional:  Negative for chills, fatigue, fever and unexpected weight change.   HENT:  Negative for ear pain and sore throat.    Eyes:  Negative for redness and visual disturbance.   Respiratory:  Negative for cough and shortness of breath.    Cardiovascular:  Negative for chest pain and palpitations.   Gastrointestinal:  Negative for nausea and vomiting.   Endocrine: Negative for cold intolerance and heat intolerance.   Genitourinary:  Negative for difficulty urinating and hematuria.   Musculoskeletal:  Negative for arthralgias and myalgias.   Skin:  Negative for rash and wound.   Allergic/Immunologic: Negative for environmental allergies and food allergies.   Neurological:  Negative for weakness and headaches.   Hematological:  Negative for adenopathy. Does not bruise/bleed easily.   Psychiatric/Behavioral:  Positive for sleep disturbance. The patient is not nervous/anxious.         Objective:      Physical Exam  Vitals and nursing note reviewed.   Constitutional:       General: He is not in acute distress.     Appearance: He is well-developed.   HENT:      Head: Normocephalic and atraumatic.   Eyes:      Pupils: Pupils are equal, round, and reactive to light.   Cardiovascular:      Rate and Rhythm: Normal rate and regular rhythm.      Heart sounds: Normal heart sounds. No murmur  heard.  Pulmonary:      Effort: Pulmonary effort is normal. No respiratory distress.      Breath sounds: Normal breath sounds. No wheezing.   Musculoskeletal:         General: Normal range of motion.      Cervical back: Normal range of motion and neck supple.   Skin:     General: Skin is warm and dry.      Capillary Refill: Capillary refill takes less than 2 seconds.   Neurological:      Mental Status: He is alert and oriented to person, place, and time.      Cranial Nerves: No cranial nerve deficit.   Psychiatric:         Behavior: Behavior normal.        Assessment / Plan:      1.  Routine health exam-patient here for annual wellness exam.  Labs ordered.  Health maintenance was reviewed and ordered.    BPH with LUTS: On uroxatral. Considering TURP. Follow with Urology.     Insomnia/daytime fatigue:  Follow-up with ENT for sleep study and further evaluation.  Start melatonin at bedtime.Discussed insomnia condition course.  Advised of first-line medications for this condition.  Also discussed sleep hygiene.  Information was given below.  Good sleep habits (sometimes referred to as sleep hygiene) can help you get a good nights sleep.    Some habits that can improve your sleep health:  -Be consistent. Go to bed at the same time each night and get up at the same time each morning, including on the weekends  -Make sure your bedroom is quiet, dark, relaxing, and at a comfortable temperature  -Remove electronic devices, such as TVs, computers, and smart phones, from the bedroom  -Avoid large meals, caffeine, and alcohol before bedtime  -Get some exercise. Being physically active during the day can help you fall asleep more easily at night.    Thyroid nodules:  Reviewed recent labs and thyroid ultrasound.  Patient is concerned about the nodules that are increasing in size.  Patient was told by ENT that he needs to have the nodules removed.  He is going to have a 2nd opinion by Endocrinology at [facility].      Reflux:  Add  Pepcid 40 milligrams daily.  Continue Protonix 40 daily. GERD RECOMMENDATIONS  caffeine reduction dietary changes elevate HOB NPO after supper  - Take PPI in the morning 30-60 minutes before breakfast  - Educated patient on lifestyle modifications to help control/reduce reflux/abdominal pain including: avoid large meals, avoid eating within 2-3 hours of bedtime (avoid late night eating & lying down soon after eating), elevate head of bed if nocturnal symptoms are present, smoking cessation (if current smoker), & weight loss (if overweight).   - Educated to avoid known foods which trigger reflux symptoms & to minimize/avoid high-fat foods, chocolate, caffeine, citrus, alcohol, & tomato products.  - Advised to avoid/limit use of NSAID's, since they can cause GI upset, bleeding, and/or ulcers. If needed, take with food.     Insomnia:  Restart melatonin and over-the-counter sleep aids.  If no improvement would benefit from in house sleep study.  Reports occasional snoring.  Skin lesion: Referral to Patricia Roca MD for Dermatology for 2nd opinion per patient request.  Complete history and physical was completed today.  Complete and thorough medication reconciliation was performed.  Discussed risks and benefits of medications.  Advised patient on orders and health maintenance.  We discussed old records and old labs if available.  Will request any records not available through epic.  Continue current medications listed on your summary sheet.    All questions were answered. Patient had no further concerns. Advised of diagnoses and plan. Follow up as planned or return sooner if symptoms persist or worsen.     Orders Placed This Encounter   Procedures    CBC Without Differential     Standing Status:   Future     Standing Expiration Date:   7/22/2024    Comprehensive Metabolic Panel     Standing Status:   Future     Standing Expiration Date:   7/22/2024    TSH     Standing Status:   Future     Standing Expiration Date:    7/22/2024    Hemoglobin A1C     Standing Status:   Future     Standing Expiration Date:   7/22/2024    Lipid Panel     Standing Status:   Future     Standing Expiration Date:   7/22/2024    Ambulatory referral/consult to Dermatology     Standing Status:   Future     Standing Expiration Date:   6/24/2024     Referral Priority:   Routine     Referral Type:   Consultation     Referral Reason:   Specialty Services Required     Referred to Provider:   Patricia Roca MD     Requested Specialty:   Dermatology     Number of Visits Requested:   1    POCT COVID-19 Rapid Screening     Standing Status:   Future     Number of Occurrences:   1     Standing Expiration Date:   7/22/2024     Order Specific Question:   Is the patient symptomatic?     Answer:   Yes    POCT Influenza A/B Molecular     Standing Status:   Future     Number of Occurrences:   1     Standing Expiration Date:   7/22/2024       Medications Ordered This Encounter   Medications    predniSONE (DELTASONE) 5 MG tablet     Sig: Take 1 tablet (5 mg total) by mouth once daily. for 5 days     Dispense:  5 tablet     Refill:  0              Chato Franklin MD

## 2023-06-02 ENCOUNTER — PATIENT OUTREACH (OUTPATIENT)
Dept: ADMINISTRATIVE | Facility: HOSPITAL | Age: 53
End: 2023-06-02
Payer: COMMERCIAL

## 2023-12-20 ENCOUNTER — TELEPHONE (OUTPATIENT)
Dept: FAMILY MEDICINE | Facility: CLINIC | Age: 53
End: 2023-12-20
Payer: COMMERCIAL

## 2023-12-20 ENCOUNTER — TELEPHONE (OUTPATIENT)
Dept: FAMILY MEDICINE | Facility: CLINIC | Age: 53
End: 2023-12-20

## 2023-12-20 ENCOUNTER — OFFICE VISIT (OUTPATIENT)
Dept: FAMILY MEDICINE | Facility: CLINIC | Age: 53
End: 2023-12-20
Payer: COMMERCIAL

## 2023-12-20 VITALS
SYSTOLIC BLOOD PRESSURE: 124 MMHG | WEIGHT: 160 LBS | HEART RATE: 74 BPM | BODY MASS INDEX: 22.9 KG/M2 | HEIGHT: 70 IN | TEMPERATURE: 98 F | DIASTOLIC BLOOD PRESSURE: 71 MMHG

## 2023-12-20 DIAGNOSIS — J02.9 SORE THROAT: Primary | ICD-10-CM

## 2023-12-20 LAB
CTP QC/QA: YES
FLUAV AG NPH QL: NEGATIVE
FLUBV AG NPH QL: NEGATIVE
S PYO RRNA THROAT QL PROBE: NEGATIVE
SARS-COV-2 RDRP RESP QL NAA+PROBE: NEGATIVE

## 2023-12-20 PROCEDURE — 3008F PR BODY MASS INDEX (BMI) DOCUMENTED: ICD-10-PCS | Mod: CPTII,S$GLB,, | Performed by: FAMILY MEDICINE

## 2023-12-20 PROCEDURE — 87804 POCT INFLUENZA A/B: ICD-10-PCS | Mod: 59,QW,S$GLB, | Performed by: FAMILY MEDICINE

## 2023-12-20 PROCEDURE — 1160F RVW MEDS BY RX/DR IN RCRD: CPT | Mod: CPTII,S$GLB,, | Performed by: FAMILY MEDICINE

## 2023-12-20 PROCEDURE — 3078F PR MOST RECENT DIASTOLIC BLOOD PRESSURE < 80 MM HG: ICD-10-PCS | Mod: CPTII,S$GLB,, | Performed by: FAMILY MEDICINE

## 2023-12-20 PROCEDURE — 3074F SYST BP LT 130 MM HG: CPT | Mod: CPTII,S$GLB,, | Performed by: FAMILY MEDICINE

## 2023-12-20 PROCEDURE — 99214 OFFICE O/P EST MOD 30 MIN: CPT | Mod: S$GLB,,, | Performed by: FAMILY MEDICINE

## 2023-12-20 PROCEDURE — 3008F BODY MASS INDEX DOCD: CPT | Mod: CPTII,S$GLB,, | Performed by: FAMILY MEDICINE

## 2023-12-20 PROCEDURE — 1159F PR MEDICATION LIST DOCUMENTED IN MEDICAL RECORD: ICD-10-PCS | Mod: CPTII,S$GLB,, | Performed by: FAMILY MEDICINE

## 2023-12-20 PROCEDURE — 1159F MED LIST DOCD IN RCRD: CPT | Mod: CPTII,S$GLB,, | Performed by: FAMILY MEDICINE

## 2023-12-20 PROCEDURE — 87880 STREP A ASSAY W/OPTIC: CPT | Mod: QW,S$GLB,, | Performed by: FAMILY MEDICINE

## 2023-12-20 PROCEDURE — 99214 PR OFFICE/OUTPT VISIT, EST, LEVL IV, 30-39 MIN: ICD-10-PCS | Mod: S$GLB,,, | Performed by: FAMILY MEDICINE

## 2023-12-20 PROCEDURE — 99999 PR PBB SHADOW E&M-EST. PATIENT-LVL IV: CPT | Mod: PBBFAC,,, | Performed by: FAMILY MEDICINE

## 2023-12-20 PROCEDURE — 3078F DIAST BP <80 MM HG: CPT | Mod: CPTII,S$GLB,, | Performed by: FAMILY MEDICINE

## 2023-12-20 PROCEDURE — 1160F PR REVIEW ALL MEDS BY PRESCRIBER/CLIN PHARMACIST DOCUMENTED: ICD-10-PCS | Mod: CPTII,S$GLB,, | Performed by: FAMILY MEDICINE

## 2023-12-20 PROCEDURE — 87635: ICD-10-PCS | Mod: QW,S$GLB,, | Performed by: FAMILY MEDICINE

## 2023-12-20 PROCEDURE — 3074F PR MOST RECENT SYSTOLIC BLOOD PRESSURE < 130 MM HG: ICD-10-PCS | Mod: CPTII,S$GLB,, | Performed by: FAMILY MEDICINE

## 2023-12-20 PROCEDURE — 87635 SARS-COV-2 COVID-19 AMP PRB: CPT | Mod: QW,S$GLB,, | Performed by: FAMILY MEDICINE

## 2023-12-20 PROCEDURE — 87804 INFLUENZA ASSAY W/OPTIC: CPT | Mod: 59,QW,S$GLB, | Performed by: FAMILY MEDICINE

## 2023-12-20 PROCEDURE — 87880 POCT RAPID STREP A: ICD-10-PCS | Mod: QW,S$GLB,, | Performed by: FAMILY MEDICINE

## 2023-12-20 PROCEDURE — 99999 PR PBB SHADOW E&M-EST. PATIENT-LVL IV: ICD-10-PCS | Mod: PBBFAC,,, | Performed by: FAMILY MEDICINE

## 2023-12-20 RX ORDER — PREDNISONE 20 MG/1
20 TABLET ORAL DAILY
Qty: 5 TABLET | Refills: 0 | Status: SHIPPED | OUTPATIENT
Start: 2023-12-20 | End: 2023-12-25

## 2023-12-20 NOTE — PATIENT INSTRUCTIONS
Follow up if symptoms worsen or fail to improve.     Dear patient,   As a result of recent federal legislation (The Federal Cures Act), you may receive lab or pathology results from your visit in your [facility] account before your physician is able to contact you. Your physician or their representative will relay the results to you with their recommendations at their soonest availability.     If no improvement in symptoms or symptoms worsen, please be advised to call MD, follow-up at clinic and/or go to ER if becomes severe.    Chato Franklin M.D.        We Offer TELEHEALTH & Same Day Appointments!   Book your Telehealth appointment with me through my nurse or   Clinic appointments on [employer]!    [address] [place] [address]    Office: [phone]   FAX: [phone]    Check out my Facebook Page and Follow Me at: [email or web address]    Check out my website at [employer] by clicking on: [email or web address]    To Schedule appointments online, go to [employer]: [email or web address]

## 2023-12-20 NOTE — Clinical Note
CALL patient with results and Document verification.  Schedule follow-up if needed. [phone] Rapid strep, COVID and influenza test are negative.  Proceed with prednisone as prescribed.  Follow-up sooner if no improvement in symptoms.

## 2023-12-20 NOTE — TELEPHONE ENCOUNTER
----- Message from Cheyanne Ambrocio sent at 12/20/2023  9:02 AM CST -----  Contact: Fortino Freitas is calling to speak to the nurse regarding a sooner appointment for today , he is a personal friend that goes to [employer] with Dr. Franklin and he told the patient to call anytime for a appointment. Please give him a call back a , he is having upper respiratory symptoms     Thanks  LJ

## 2023-12-20 NOTE — TELEPHONE ENCOUNTER
----- Message from Chato Franklin MD sent at 12/20/2023  2:10 PM CST -----  CALL patient with results and Document verification.  Schedule follow-up if needed.  [phone]  Rapid strep, COVID and influenza test are negative.  Proceed with prednisone as prescribed.  Follow-up sooner if no improvement in symptoms.

## 2023-12-20 NOTE — PROGRESS NOTES
1st check to see if patient has seen the results.  If not then  CALL patient with results and Document verification.  Schedule follow-up if needed.  [phone]  Rapid strep, COVID and influenza test are negative.  Proceed with prednisone as prescribed.  Follow-up sooner if no improvement in symptoms.

## 2023-12-20 NOTE — TELEPHONE ENCOUNTER
Fortino is calling to speak to the nurse regarding a sooner appointment for today , he is a personal friend that goes to [employer] with Dr. Franklin and he told the patient to call anytime for a appointment . Pt is having an upper respiratory infections and wants to see dr franklin only. Please advise if he can be overbooked on your schedule

## 2024-05-18 ENCOUNTER — PATIENT MESSAGE (OUTPATIENT)
Dept: FAMILY MEDICINE | Facility: CLINIC | Age: 54
End: 2024-05-18
Payer: COMMERCIAL

## 2024-09-11 ENCOUNTER — PATIENT MESSAGE (OUTPATIENT)
Dept: FAMILY MEDICINE | Facility: CLINIC | Age: 54
End: 2024-09-11
Payer: COMMERCIAL

## 2024-11-04 ENCOUNTER — PATIENT MESSAGE (OUTPATIENT)
Dept: FAMILY MEDICINE | Facility: CLINIC | Age: 54
End: 2024-11-04
Payer: COMMERCIAL

## 2024-11-04 ENCOUNTER — TELEPHONE (OUTPATIENT)
Dept: FAMILY MEDICINE | Facility: CLINIC | Age: 54
End: 2024-11-04
Payer: COMMERCIAL

## 2024-11-04 NOTE — TELEPHONE ENCOUNTER
Scroll to his first message. I'm trying to get him scheduled but he wants you to look at this in the meantime.

## 2024-11-06 ENCOUNTER — OFFICE VISIT (OUTPATIENT)
Dept: FAMILY MEDICINE | Facility: CLINIC | Age: 54
End: 2024-11-06
Payer: COMMERCIAL

## 2024-11-06 VITALS
WEIGHT: 158.31 LBS | DIASTOLIC BLOOD PRESSURE: 66 MMHG | HEART RATE: 66 BPM | OXYGEN SATURATION: 100 % | BODY MASS INDEX: 23.45 KG/M2 | HEIGHT: 69 IN | SYSTOLIC BLOOD PRESSURE: 110 MMHG

## 2024-11-06 DIAGNOSIS — M51.360 DEGENERATION OF INTERVERTEBRAL DISC OF LUMBAR REGION WITH DISCOGENIC BACK PAIN: ICD-10-CM

## 2024-11-06 DIAGNOSIS — K21.00 GASTROESOPHAGEAL REFLUX DISEASE WITH ESOPHAGITIS WITHOUT HEMORRHAGE: Primary | ICD-10-CM

## 2024-11-06 DIAGNOSIS — Z79.899 ENCOUNTER FOR LONG-TERM (CURRENT) USE OF MEDICATIONS: ICD-10-CM

## 2024-11-06 DIAGNOSIS — E04.1 THYROID NODULE: ICD-10-CM

## 2024-11-06 DIAGNOSIS — M51.46 SCHMORL'S NODES OF LUMBAR REGION: ICD-10-CM

## 2024-11-06 DIAGNOSIS — M51.44: ICD-10-CM

## 2024-11-06 PROBLEM — U07.1 COVID-19: Status: RESOLVED | Noted: 2020-09-21 | Resolved: 2024-11-06

## 2024-11-06 PROCEDURE — 3074F SYST BP LT 130 MM HG: CPT | Mod: CPTII,S$GLB,, | Performed by: FAMILY MEDICINE

## 2024-11-06 PROCEDURE — 99214 OFFICE O/P EST MOD 30 MIN: CPT | Mod: S$GLB,,, | Performed by: FAMILY MEDICINE

## 2024-11-06 PROCEDURE — 99999 PR PBB SHADOW E&M-EST. PATIENT-LVL V: CPT | Mod: PBBFAC,,, | Performed by: FAMILY MEDICINE

## 2024-11-06 PROCEDURE — 1159F MED LIST DOCD IN RCRD: CPT | Mod: CPTII,S$GLB,, | Performed by: FAMILY MEDICINE

## 2024-11-06 PROCEDURE — G2211 COMPLEX E/M VISIT ADD ON: HCPCS | Mod: S$GLB,,, | Performed by: FAMILY MEDICINE

## 2024-11-06 PROCEDURE — 3008F BODY MASS INDEX DOCD: CPT | Mod: CPTII,S$GLB,, | Performed by: FAMILY MEDICINE

## 2024-11-06 PROCEDURE — 3078F DIAST BP <80 MM HG: CPT | Mod: CPTII,S$GLB,, | Performed by: FAMILY MEDICINE

## 2024-11-06 PROCEDURE — 1160F RVW MEDS BY RX/DR IN RCRD: CPT | Mod: CPTII,S$GLB,, | Performed by: FAMILY MEDICINE

## 2024-11-06 RX ORDER — DIAZEPAM 10 MG/1
TABLET ORAL
Qty: 1 TABLET | Refills: 0 | Status: SHIPPED | OUTPATIENT
Start: 2024-11-06

## 2024-11-06 RX ORDER — PANTOPRAZOLE SODIUM 40 MG/1
40 TABLET, DELAYED RELEASE ORAL DAILY
Qty: 90 TABLET | Refills: 4 | Status: SHIPPED | OUTPATIENT
Start: 2024-11-06

## 2024-11-06 NOTE — PATIENT INSTRUCTIONS
Follow up in about 1 year (around 11/6/2025), or if symptoms worsen or fail to improve.     Dear patient,   As a result of recent federal legislation (The Federal Cures Act), you may receive lab or pathology results from your visit in your [facility] account before your physician is able to contact you. Your physician or their representative will relay the results to you with their recommendations at their soonest availability.     If no improvement in symptoms or symptoms worsen, please be advised to call MD, follow-up at clinic and/or go to ER if becomes severe.    Chato Franklin M.D.        We Offer TELEHEALTH & Same Day Appointments!   Book your Telehealth appointment with me through my nurse or   Clinic appointments on [employer]!    [address] [place] [address]    Office: [phone]   FAX: [phone]    Check out my Facebook Page and Follow Me at: [email or web address]    Check out my website at [employer] by clicking on: [email or web address]    To Schedule appointments online, go to [email or web address]: [email or web address]

## 2024-11-06 NOTE — PROGRESS NOTES
PLAN:      Assessment & Plan  1. Thyroid nodule.  The patient has a thyroid nodule that has been monitored with three fine needle aspirations (FNA), all of which were benign. He is advised to follow up with his endocrinologist, Dr. Cat, to discuss further management options, including radiofrequency ablation (RFA) or surgical removal. An MRI of the thoracic and lumbar spine will be ordered for further evaluation.     2. Schmorl's nodes.  Incidental findings on the CT scan showed Schmorl's nodes in the lower thoracic and lumbar spine. An MRI of the thoracic and lumbar spine will be ordered to get a better resolution and rule out any other potential issues. A prescription for Valium 10 mg has been provided, to be taken 30 minutes prior to the MRI to help with claustrophobia.    3. Prostate issues.  The patient has a history of prostate problems with coarse calcifications and frequent urination. He is advised to follow up with Dr. Johnson for a different perspective on his prostate issues. Dr. Acuna has suggested monitoring the condition and considering a TURP if symptoms worsen.    4. Gastroesophageal Reflux Disease (GERD).  His pantoprazole prescription has been refilled to manage his reflux symptoms.  GERD RECOMMENDATIONS  Please be advised of condition course.  - Take PPI in the morning 30-60 minutes before breakfast  - I recommend ongoing Education for lifestyle modifications to help control/reduce reflux/abdominal pain including: avoid large meals, avoid eating within 2-3 hours of bedtime (avoid late night eating & lying down soon after eating), elevate head of bed if nocturnal symptoms are present, smoking cessation (if current smoker), & weight loss (if overweight).   - please be advised to avoid known foods which trigger reflux symptoms & to minimize/avoid high-fat foods, chocolate, caffeine, citrus, alcohol, & tomato products.  - Advised to avoid/limit use of NSAID's, since they can cause GI upset, bleeding,  and/or ulcers. If needed, take with food.     5. Sleep disturbances.  He reports waking up at 3:00 AM and having difficulty going back to sleep. Over-the-counter melatonin and magnesium oxide 400 mg are recommended for sleep aid, to be taken 30 minutes before bedtime.Discussed insomnia condition course.  Advised of first-line medications for this condition.  Also discussed sleep hygiene.  Information was given below.  Good sleep habits (sometimes referred to as sleep hygiene) can help you get a good nights sleep.    Some habits that can improve your sleep health:  -Be consistent. Go to bed at the same time each night and get up at the same time each morning, including on the weekends  -Make sure your bedroom is quiet, dark, relaxing, and at a comfortable temperature  -Remove electronic devices, such as TVs, computers, and smart phones, from the bedroom  -Avoid large meals, caffeine, and alcohol before bedtime  -Get some exercise. Being physically active during the day can help you fall asleep more easily at night.      6. Health Maintenance.  He is advised to take creatine monohydrate 5 g daily and ensure adequate hydration, aiming for 64 ounces of water per day.        Problem List Items Addressed This Visit       Thyroid nodule (Chronic)    Encounter for long-term (current) use of medications (Chronic)    Relevant Medications    pantoprazole (PROTONIX) 40 MG tablet    Gastroesophageal reflux disease with esophagitis without hemorrhage - Primary (Chronic)    Degeneration of intervertebral disc of lumbar region with discogenic back pain (Chronic)    Relevant Medications    diazePAM (VALIUM) 10 MG Tab    Schmorl's thoracic nodes    Relevant Medications    diazePAM (VALIUM) 10 MG Tab    Other Relevant Orders    MRI Thoracic Spine Without Contrast    MRI Lumbar Spine Without Contrast    Schmorl's nodes of lumbar region    Relevant Medications    diazePAM (VALIUM) 10 MG Tab    Other Relevant Orders    MRI Thoracic  Spine Without Contrast    MRI Lumbar Spine Without Contrast        Medication Management for assessment above:   Medication List with Changes/Refills   New Medications    DIAZEPAM (VALIUM) 10 MG TAB    Take one tab 30 minutes prior to your scheduled time for the MRI.   Current Medications    BIOTIN 1 MG CAP    Take by mouth.    CETIRIZINE (ZYRTEC) 10 MG TABLET    Take 10 mg by mouth daily as needed.    CHOLECALCIFEROL, VITAMIN D3, (VITAMIN D3) 25 MCG (1,000 UNIT) CAPSULE    Take 1,000 Units by mouth.    CYANOCOBALAMIN (VITAMIN B-12) 1000 MCG TABLET    Take 1,000 mcg by mouth.    FAMOTIDINE (PEPCID) 40 MG TABLET    Take 1 tablet (40 mg total) by mouth once daily.    MUPIROCIN (BACTROBAN) 2 % OINTMENT    Apply topically 3 (three) times daily. Until healed    PRASTERONE, DHEA, 25 MG CAP    Take 1 capsule by mouth.    TERBINAFINE HCL (LAMISIL) 250 MG TABLET    Take 1 tab PO weekly x 4 weeks.   Changed and/or Refilled Medications    Modified Medication Previous Medication    PANTOPRAZOLE (PROTONIX) 40 MG TABLET pantoprazole (PROTONIX) 40 MG tablet       Take 1 tablet (40 mg total) by mouth once daily. Okay to take every other day if needed.    Take 1 tablet (40 mg total) by mouth once daily. Okay to take every other day if needed.   Discontinued Medications    ALFUZOSIN (UROXATRAL) 10 MG TB24    Take 10 mg by mouth.    TADALAFIL (CIALIS) 5 MG TABLET    Take 5 mg by mouth.       Chato Franklin M.D.  ==========================================================================  Subjective:   Patient ID: Fortino Castro is a 54 y.o. male.  has a past medical history of GERD (gastroesophageal reflux disease) and Thyroid nodule (12/7/2019).   Chief Complaint: Follow-up (Ct results)      History of Present Illness  The patient is a 54-year-old male here to review a CT scan from another provider, which included a CT of the abdomen and pelvis for a renal protocol. An incidental finding showed Schmorl's nodes in the lower thoracic  and lumbar spine. He is concerned about a possible correlation with his thyroid condition and thyroid cancer. He would like to have this monitored but is not experiencing any issues related to that area at this time.    He reports experiencing some reflux and would like a refill on his medication.    He has a history of prostate issues but no kidney stones. His prostate is of normal size, as confirmed by a recent CT scan. However, he has coarse calcifications in the prostate, a condition known from an ultrasound years ago. He also has an undescended testicle in his left groin, which Dr. Acuna plans to monitor via ultrasound. He experiences lumbar soreness and pain associated with prostate flare-ups, which can be triggered by certain movements. He had a prostate flare-up last night, leading to frequent urination and testicular pain. Despite his prostate being of normal size, he experiences frequent urination. He has been advised to consider green light laser treatment or TURP for his prostate issues.    He has a thyroid nodule monitored by Dr. Almonte, which has been benign in three fine needle aspirations. Dr. Cat is considering radiofrequency ablation or removal of the nodule.    He also reports dull, aching back pain and difficulty sleeping, often waking up at 3:00 AM and struggling to return to sleep.    FAMILY HISTORY  His sister had thyroid cancer.    Problem List Items Addressed This Visit       Thyroid nodule (Chronic)    Overview     Followed by ENDO- Dr. Almonte , FNA thyroid with Dr. Cat at [facility] , 3 negative biopsies  US FINE NEEDLE ASPIRATION W GUIDE   CLINICAL INFORMATION: Right thyroid nodule.   PROCEDURE: Ultrasound guided right thyroid fine needle aspiration.   : Kevon.   MEDICATIONS: Lidocaine 1%.   TECHNIQUE: Procedure, including risks and benefits, was discussed with    patient. Informed consent was obtained. Patient was placed supine. The    skin overlying the neck was prepped and  draped in the usual sterile    fashion using ChloraPrep. A time-out was performed. Lidocaine was    administered for local anesthesia. Under ultrasound guidance, fine needle    aspiration was performed of the dominant nodule within the right thyroid    lobe. A total of five passes were performed using 27-gauge needles. A    Band-Aid was placed over the incision. Patient tolerated the procedure    without immediate complications.     IMPRESSION:   Successful ultrasound guided fine needle aspiration of right thyroid    nodule. Cytology is pending.   AIDAN MCCARTY M.D.   ELECTRONICALLY SIGNED ON 06/05/2014 10:29:16          Encounter for long-term (current) use of medications (Chronic)    Gastroesophageal reflux disease with esophagitis without hemorrhage - Primary (Chronic)    Degeneration of intervertebral disc of lumbar region with discogenic back pain (Chronic)    Schmorl's thoracic nodes    Schmorl's nodes of lumbar region    Overview     REASON FOR EXAM: [R10.9]-Unspecified abdominal pain    TECHNICAL FACTORS: Multiple contiguous axial CT images were obtained of the abdomen and pelvis without administration of intravenous contrast. 2D reformatted images were performed. Automated exposure control was utilized for radiation dose reduction.    COMPARISON: Renal stone CT 09/02/2009    ABDOMEN FINDINGS: The visualized lung bases are clear. The unenhanced liver, spleen, pancreas, adrenal glands, and kidneys are normal in appearance.  Contracted gallbladder. The small and large bowel are without evidence of obstruction or inflammation.  Mild colonic stool burden.  There is no evidence of free fluid or lymphadenopathy.  There are small Schmorl's nodes seen throughout the vertebral bodies of the lower thoracic and lumbar spine. No aggressive osseous lesion.    PELVIS FINDINGS: No abnormality the urinary bladder. There are a few coarse calcifications within a normal-sized prostate.  Normal appearance of the appendix  (series 602 image 101).  No bowel obstruction or inflammation.  There is no evidence of free  fluid or lymphadenopathy.  Mild degenerative changes of the spine. There is a 2.7 x 1.3 cm oval fluid collection in the left inguinal soft tissues overlying the rectus musculature (series 2 image 70). This appears unchanged.    IMPRESSION:   1.  No acute findings.     2.  Probable mild colonic constipation. No obstructive pattern.  3. Stable discrete cystic mass in the left groin. Given its stability dating back until 2009, a benign diagnosis is assigned.        Electronically signed by Adilson Willis MD on 10/29/2024 3:48 PM  Exam End: 10/29/24 14:52                Review of patient's allergies indicates:   Allergen Reactions    Clarithromycin Palpitations    Sulfa (sulfonamide antibiotics) Hives    Sulfamethoxazole-trimethoprim Hives and Itching     Current Outpatient Medications   Medication Instructions    biotin 1 mg Cap Take by mouth.    cetirizine (ZYRTEC) 10 mg, Daily PRN    cholecalciferol (vitamin D3) (VITAMIN D3) 1,000 Units    cyanocobalamin (VITAMIN B-12) 1,000 mcg    diazePAM (VALIUM) 10 MG Tab Take one tab 30 minutes prior to your scheduled time for the MRI.    famotidine (PEPCID) 40 mg, Oral, Daily    mupirocin (BACTROBAN) 2 % ointment Topical (Top), 3 times daily, Until healed    pantoprazole (PROTONIX) 40 mg, Oral, Daily, Okay to take every other day if needed.    prasterone, dhea, 25 mg Cap 1 capsule    terbinafine HCL (LAMISIL) 250 mg tablet Take 1 tab PO weekly x 4 weeks.      I have reviewed the PMH, social history, FamilyHx, surgical history, allergies and medications documented / confirmed by the patient at the time of this visit.  Review of Systems   Constitutional:  Negative for activity change and unexpected weight change.   HENT:  Negative for hearing loss, rhinorrhea and trouble swallowing.    Eyes:  Negative for discharge and visual disturbance.   Respiratory:  Negative for chest tightness and  "wheezing.    Cardiovascular:  Negative for chest pain and palpitations.   Gastrointestinal:  Positive for constipation. Negative for blood in stool, diarrhea and vomiting.   Endocrine: Negative for polydipsia and polyuria.   Genitourinary:  Positive for difficulty urinating. Negative for hematuria and urgency.   Musculoskeletal:  Negative for arthralgias, joint swelling and neck pain.   Neurological:  Negative for weakness and headaches.   Psychiatric/Behavioral:  Negative for confusion and dysphoric mood.      Objective:   /66   Pulse 66   Ht 5' 9" (1.753 m)   Wt 71.8 kg (158 lb 4.8 oz)   SpO2 100%   BMI 23.38 kg/m²   Physical Exam  Vitals and nursing note reviewed.   Constitutional:       General: He is not in acute distress.     Appearance: He is well-developed. He is not diaphoretic.   HENT:      Head: Normocephalic and atraumatic.      Right Ear: External ear normal.      Left Ear: External ear normal.      Nose: Nose normal. No rhinorrhea.   Eyes:      Extraocular Movements: Extraocular movements intact.      Pupils: Pupils are equal, round, and reactive to light.   Cardiovascular:      Rate and Rhythm: Normal rate.      Pulses: Normal pulses.   Pulmonary:      Effort: Pulmonary effort is normal. No respiratory distress.      Breath sounds: Normal breath sounds.   Abdominal:      General: Bowel sounds are normal.      Palpations: Abdomen is soft.   Musculoskeletal:         General: No tenderness or deformity. Normal range of motion.      Cervical back: Normal range of motion and neck supple.   Skin:     General: Skin is warm and dry.      Capillary Refill: Capillary refill takes less than 2 seconds.      Findings: No rash.   Neurological:      General: No focal deficit present.      Mental Status: He is alert and oriented to person, place, and time.      Cranial Nerves: No cranial nerve deficit.      Motor: No weakness.      Gait: Gait normal.   Psychiatric:         Attention and Perception: He is " attentive.         Mood and Affect: Mood normal. Mood is not anxious or depressed. Affect is not labile, blunt, angry or inappropriate.         Speech: He is communicative. Speech is not rapid and pressured, delayed, slurred or tangential.         Behavior: Behavior normal. Behavior is not agitated, slowed, aggressive, withdrawn, hyperactive or combative.         Thought Content: Thought content normal. Thought content is not paranoid or delusional. Thought content does not include homicidal or suicidal ideation. Thought content does not include homicidal or suicidal plan.         Cognition and Memory: Memory is not impaired.         Judgment: Judgment normal. Judgment is not impulsive or inappropriate.       Physical Exam  Clear lungs.    Results  Imaging  CT scan of the abdomen and pelvis showed Schmorl's nodes in the lower thoracic and lumbar spine. Lungs were clear. Liver, spleen, pancreas, adrenal glands, kidneys are normal in appearance. Contracted gallbladder. Small and large bowel without evidence of obstruction or inflammation. Mild constipation. No evidence of free fluid or lymph nodes. Small Schmorl's nodes seen throughout the vertebral bodies of the lower thoracic and lumbar spine. No aggressive osseous lesion. Pelvis, normal urinary, bladder, few coarse calcifications, normal size prostate, normal appearance of the appendix. No bowel obstruction or inflammation. Degenerative change of the spine which is arthritis. 2.7 x 1.3 oval fluid collection in the left inguinal soft tissues overlying the rectus musculature. This appears unchanged. Been there since 2009.    Assessment:     1. Gastroesophageal reflux disease with esophagitis without hemorrhage    2. Encounter for long-term (current) use of medications    3. Schmorl's nodes of lumbar region    4. Schmorl's thoracic nodes    5. Degeneration of intervertebral disc of lumbar region with discogenic back pain    6. Thyroid nodule      MDM:   Moderate  medical complexity.  Moderate risk.  Total time: 31 minutes.  This includes total time spent on the encounter, which includes face to face time and non-face to face time preparing to see the patient (eg, review of previous medical records, tests), Obtaining and/or reviewing separately obtained history, documenting clinical information in the electronic or other health record, independently interpreting results (not separately reported)/communicating results to the patient/family/caregiver, and/or care coordination (not separately reported).    I have Reviewed and summarized old records.  I have performed thorough medication reconciliation today and discussed risk and benefits of medications.  I have reviewed labs and discussed with patient.  All questions were answered.  I am requesting old records and will review them once they are available.  Visit today included increased complexity associated with the care of the episodic problem see above assessment addressed and managing the longitudinal care of the patient due to the serious and/or complex managed problem(s) see above.  I have signed for the following orders AND/OR meds.  Orders Placed This Encounter   Procedures    MRI Thoracic Spine Without Contrast     Standing Status:   Future     Number of Occurrences:   1     Standing Expiration Date:   11/6/2025     Order Specific Question:   Does the patient have or ever had a pacemaker or a defibrillator (Note: Some facilities may not be able to schedule an MRI for patients with pacemakers and defibrillators. You should contact your local radiology dept to determine if this is the case.)?     Answer:   No     Order Specific Question:   Does the patient have an aneurysm or surgical clip, pump, nerve/brain stimulator, middle/inner ear prosthesis, or other metal implant or foreign object (bullet, shrapnel)? If they have a card related to their implant, ask them to bring it. Issues related to the implant may cause the  MRI to be delayed.     Answer:   No     Order Specific Question:   Will the patient require po anxiolysis or sedation?     Answer:   No     Order Specific Question:   May the Radiologist modify the order per protocol to meet the clinical needs of the patient?     Answer:   Yes     Order Specific Question:   Recist criteria?     Answer:   Yes     Order Specific Question:   Does the patient have on a skin patch for medication with aluminized backing?     Answer:   No    MRI Lumbar Spine Without Contrast     Standing Status:   Future     Number of Occurrences:   1     Standing Expiration Date:   11/6/2025     Order Specific Question:   Does the patient have or ever had a pacemaker or a defibrillator (Note: Some facilities may not be able to schedule an MRI for patients with pacemakers and defibrillators. You should contact your local radiology dept to determine if this is the case.)?     Answer:   No     Order Specific Question:   Does the patient have an aneurysm or surgical clip, pump, nerve/brain stimulator, middle/inner ear prosthesis, or other metal implant or foreign object (bullet, shrapnel)? If they have a card related to their implant, ask them to bring it. Issues related to the implant may cause the MRI to be delayed.     Answer:   No     Order Specific Question:   Will the patient require po anxiolysis or sedation?     Answer:   No     Order Specific Question:   May the Radiologist modify the order per protocol to meet the clinical needs of the patient?     Answer:   Yes     Order Specific Question:   Recist criteria?     Answer:   Yes     Order Specific Question:   Does the patient have on a skin patch for medication with aluminized backing?     Answer:   No     Medications Ordered This Encounter   Medications    diazePAM (VALIUM) 10 MG Tab     Sig: Take one tab 30 minutes prior to your scheduled time for the MRI.     Dispense:  1 tablet     Refill:  0    pantoprazole (PROTONIX) 40 MG tablet     Sig: Take 1  tablet (40 mg total) by mouth once daily. Okay to take every other day if needed.     Dispense:  90 tablet     Refill:  4        Follow up in about 1 year (around 11/6/2025), or if symptoms worsen or fail to improve.    If no improvement in symptoms or symptoms worsen, advised to call/follow-up at clinic or go to ER. Patient voiced understanding and all questions/concerns were addressed.   DISCLAIMER: This note was compiled by using a speech recognition dictation system and therefore please be aware that typographical / speech recognition errors can and do occur.  Please contact me if you see any errors specifically.  Consent was obtained for [employer] recording system prior to the visit.    Chato Franklin M.D.       Office: [phone] [address] [place] [address]  FAX: [phone]

## 2024-12-03 ENCOUNTER — TELEPHONE (OUTPATIENT)
Dept: FAMILY MEDICINE | Facility: CLINIC | Age: 54
End: 2024-12-03
Payer: COMMERCIAL

## 2024-12-03 NOTE — TELEPHONE ENCOUNTER
----- Message from Macarena sent at 12/3/2024  2:19 PM CST -----  Contact: [phone]  Caller is requesting an earlier appointment then we can schedule.  Caller is requesting a message be sent to the provider.    If this is for urgent care symptoms, did you offer other providers at this location, providers at other locations, or [facility]? (yes, no, n/a):  yes    If this is for the patients physical, did you offer to schedule next available and put on wait list, or to see NP or PA for their physical?  (yes, no, n/a):  n/a    When is the next available appointment with their provider:  2/13/2025    Reason for the appointment:  cough, chest congestion    Patient preference of timeframe to be scheduled:  tomorrow or Thursday after lunch    Would the patient like a call back, or a response through their [facility] portal?:   call    Comments:   79 y/o here with hypothermia, bradycardia, Hx of ESRD  Vitals:  Improved    Labs:  Hyperkalemia with hyperglycemia, given insulin but developed hypoglycemia and then given dextrose  Given calcium  Hb of 6 7 drop from 7 5    Assessment/plan:    1  ESRD w/ hyperkalemia- received insulin and calcium gluconate  2  Acute on chronic anemia- unclear etiology  3  Hypothermia/bradycardia- resolved  TSH normal  4   HTN      · Nephrology on board, appreciate recommendations  · Dialysis today  · Antibodies present on type and screen, cross match pending, will need 1 units PRBC  · Continue atorvastatin and hydralazine prn  · Continue carvedilol, borderline bradycardia, hold parameters noted  · Iron studies, FOBT  · F/up Echo, monitor on telemetry  · Repeat BMP for hyperkalemia  · SSI  · Heparin DVT prophylaxis    Valery Obregon MD

## 2024-12-03 NOTE — TELEPHONE ENCOUNTER
Noah alfaro. Pt was advised that Dr. Franklin does not have any openings, but I can schedule him to see one of the NP's here in the office. Pt has been scheduled to see ROMEL Ro on 12/04/2024.

## 2024-12-04 ENCOUNTER — HOSPITAL ENCOUNTER (OUTPATIENT)
Dept: RADIOLOGY | Facility: HOSPITAL | Age: 54
Discharge: HOME | End: 2024-12-04
Attending: NURSE PRACTITIONER
Payer: COMMERCIAL

## 2024-12-04 ENCOUNTER — OFFICE VISIT (OUTPATIENT)
Dept: FAMILY MEDICINE | Facility: CLINIC | Age: 54
End: 2024-12-04
Payer: COMMERCIAL

## 2024-12-04 VITALS
BODY MASS INDEX: 24.14 KG/M2 | HEIGHT: 69 IN | HEART RATE: 55 BPM | OXYGEN SATURATION: 99 % | SYSTOLIC BLOOD PRESSURE: 104 MMHG | TEMPERATURE: 99 F | DIASTOLIC BLOOD PRESSURE: 66 MMHG | WEIGHT: 163 LBS

## 2024-12-04 DIAGNOSIS — R05.9 COUGH, UNSPECIFIED TYPE: ICD-10-CM

## 2024-12-04 DIAGNOSIS — J06.9 UPPER RESPIRATORY TRACT INFECTION, UNSPECIFIED TYPE: Primary | ICD-10-CM

## 2024-12-04 PROCEDURE — 87635 SARS-COV-2 COVID-19 AMP PRB: CPT | Mod: QW,S$GLB,, | Performed by: NURSE PRACTITIONER

## 2024-12-04 PROCEDURE — 3074F SYST BP LT 130 MM HG: CPT | Mod: CPTII,S$GLB,, | Performed by: NURSE PRACTITIONER

## 2024-12-04 PROCEDURE — 3008F BODY MASS INDEX DOCD: CPT | Mod: CPTII,S$GLB,, | Performed by: NURSE PRACTITIONER

## 2024-12-04 PROCEDURE — 99999 PR PBB SHADOW E&M-EST. PATIENT-LVL V: CPT | Mod: PBBFAC,,, | Performed by: NURSE PRACTITIONER

## 2024-12-04 PROCEDURE — 71046 X-RAY EXAM CHEST 2 VIEWS: CPT | Mod: 26,,, | Performed by: RADIOLOGY

## 2024-12-04 PROCEDURE — 1160F RVW MEDS BY RX/DR IN RCRD: CPT | Mod: CPTII,S$GLB,, | Performed by: NURSE PRACTITIONER

## 2024-12-04 PROCEDURE — 71046 X-RAY EXAM CHEST 2 VIEWS: CPT | Mod: TC,PO

## 2024-12-04 PROCEDURE — 99213 OFFICE O/P EST LOW 20 MIN: CPT | Mod: 25,S$GLB,, | Performed by: NURSE PRACTITIONER

## 2024-12-04 PROCEDURE — 96372 THER/PROPH/DIAG INJ SC/IM: CPT | Mod: S$GLB,,, | Performed by: NURSE PRACTITIONER

## 2024-12-04 PROCEDURE — 1159F MED LIST DOCD IN RCRD: CPT | Mod: CPTII,S$GLB,, | Performed by: NURSE PRACTITIONER

## 2024-12-04 PROCEDURE — 87502 INFLUENZA DNA AMP PROBE: CPT | Mod: QW,S$GLB,, | Performed by: NURSE PRACTITIONER

## 2024-12-04 PROCEDURE — 3078F DIAST BP <80 MM HG: CPT | Mod: CPTII,S$GLB,, | Performed by: NURSE PRACTITIONER

## 2024-12-04 RX ORDER — CEFDINIR 300 MG/1
300 CAPSULE ORAL 2 TIMES DAILY
Qty: 20 CAPSULE | Refills: 0 | Status: SHIPPED | OUTPATIENT
Start: 2024-12-04 | End: 2024-12-14

## 2024-12-04 RX ORDER — DEXAMETHASONE SODIUM PHOSPHATE 4 MG/ML
8 INJECTION, SOLUTION INTRA-ARTICULAR; INTRALESIONAL; INTRAMUSCULAR; INTRAVENOUS; SOFT TISSUE ONCE
Status: COMPLETED | OUTPATIENT
Start: 2024-12-04 | End: 2024-12-04

## 2024-12-04 RX ORDER — BENZONATATE 200 MG/1
200 CAPSULE ORAL 3 TIMES DAILY PRN
Qty: 30 CAPSULE | Refills: 0 | Status: SHIPPED | OUTPATIENT
Start: 2024-12-04 | End: 2024-12-14

## 2024-12-04 RX ADMIN — DEXAMETHASONE SODIUM PHOSPHATE 8 MG: 4 INJECTION, SOLUTION INTRA-ARTICULAR; INTRALESIONAL; INTRAMUSCULAR; INTRAVENOUS; SOFT TISSUE at 09:12

## 2024-12-04 NOTE — PROGRESS NOTES
Subjective     Patient ID: Fortino Castro is a 54 y.o. male.    Chief Complaint: Cough and Chest Congestion    Cough  This is a new problem. The current episode started 1 to 4 weeks ago. The problem has been gradually improving. The problem occurs every few minutes. The cough is Productive of sputum. Associated symptoms include nasal congestion, postnasal drip and rhinorrhea. Pertinent negatives include no chest pain, chills, ear congestion, ear pain, fever, headaches, heartburn, hemoptysis, myalgias, rash, sore throat, shortness of breath, sweats, weight loss or wheezing. Nothing aggravates the symptoms. Treatments tried: mucinex. The treatment provided mild relief. There is no history of asthma, bronchiectasis, bronchitis, COPD, emphysema, environmental allergies or pneumonia.     Past Medical History:   Diagnosis Date    GERD (gastroesophageal reflux disease)     Thyroid nodule 12/7/2019     Social History     Socioeconomic History    Marital status:    Tobacco Use    Smoking status: Never    Smokeless tobacco: Former   Substance and Sexual Activity    Alcohol use: Not Currently    Drug use: Never    Sexual activity: Yes     Partners: Female     Social Drivers of Health     Financial Resource Strain: Low Risk  (11/5/2024)    Overall Financial Resource Strain (CARDIA)     Difficulty of Paying Living Expenses: Not hard at all   Food Insecurity: No Food Insecurity (11/5/2024)    Hunger Vital Sign     Worried About Running Out of Food in the Last Year: Never true     Ran Out of Food in the Last Year: Never true   Transportation Needs: Unknown (9/29/2021)    [facility]    PRAPARE - Transportation     Lack of Transportation (Medical): Patient declined     Lack of Transportation (Non-Medical): Patient declined   Physical Activity: Insufficiently Active (11/5/2024)    Exercise Vital Sign     Days of Exercise per Week: 2 days     Minutes of Exercise per Session:  20 min   Stress: Stress Concern Present (11/5/2024)    [employer] - Occupational Stress Questionnaire     Feeling of Stress : To some extent   Housing Stability: Unknown (11/5/2024)    Housing Stability Vital Sign     Unable to Pay for Housing in the Last Year: No     Past Surgical History:   Procedure Laterality Date    HERNIA REPAIR         Review of Systems   Constitutional: Negative.  Negative for chills, fever and weight loss.   HENT:  Positive for postnasal drip, rhinorrhea and sinus pressure/congestion. Negative for ear pain and sore throat.    Eyes: Negative.    Respiratory:  Positive for cough. Negative for hemoptysis, shortness of breath and wheezing.    Cardiovascular: Negative.  Negative for chest pain.   Gastrointestinal: Negative.  Negative for heartburn.   Endocrine: Negative.    Genitourinary: Negative.    Musculoskeletal: Negative.  Negative for myalgias.   Integumentary:  Negative for rash. Negative.   Allergic/Immunologic: Negative.  Negative for environmental allergies.   Neurological:  Negative for headaches.   Psychiatric/Behavioral: Negative.            Objective     Physical Exam  Vitals and nursing note reviewed.   Constitutional:       Appearance: Normal appearance.   HENT:      Head: Normocephalic.      Right Ear: Hearing, tympanic membrane, ear canal and external ear normal.      Left Ear: Hearing, tympanic membrane, ear canal and external ear normal.      Nose: Congestion and rhinorrhea present.      Mouth/Throat:      Mouth: Mucous membranes are moist.      Pharynx: Oropharynx is clear.   Eyes:      Conjunctiva/sclera: Conjunctivae normal.      Pupils: Pupils are equal, round, and reactive to light.   Cardiovascular:      Rate and Rhythm: Regular rhythm. Bradycardia present.      Pulses: Normal pulses.      Heart sounds: Normal heart sounds.   Pulmonary:      Effort: Pulmonary effort is normal.      Breath sounds: Normal breath sounds.   Abdominal:       General: Bowel sounds are normal.      Palpations: Abdomen is soft.   Musculoskeletal:         General: Normal range of motion.      Cervical back: Normal range of motion and neck supple.   Skin:     General: Skin is warm and dry.      Capillary Refill: Capillary refill takes 2 to 3 seconds.   Neurological:      Mental Status: He is alert and oriented to person, place, and time.   Psychiatric:         Mood and Affect: Mood normal.         Behavior: Behavior normal.         Thought Content: Thought content normal.         Judgment: Judgment normal.            Assessment and Plan     1. Upper respiratory tract infection, unspecified type  2. Cough, unspecified type  -     POCT COVID-19 Rapid Screening  -     POCT Influenza A/B Molecular  -     X-Ray Chest PA And Lateral; Future; Expected date: 12/04/2024  -     benzonatate (TESSALON) 200 MG capsule; Take 1 capsule (200 mg total) by mouth 3 (three) times daily as needed.  Dispense: 30 capsule; Refill: 0  -     dexAMETHasone injection 8 mg  -     cefdinir (OMNICEF) 300 MG capsule; Take 1 capsule (300 mg total) by mouth 2 (two) times daily. for 10 days  Dispense: 20 capsule; Refill: 0  Hydrate well  Rest  Flonase as prescribed  Report to ER immediately if symptoms worsen or persist           No follow-ups on file.

## 2024-12-19 ENCOUNTER — PATIENT MESSAGE (OUTPATIENT)
Dept: FAMILY MEDICINE | Facility: CLINIC | Age: 54
End: 2024-12-19
Payer: COMMERCIAL

## 2024-12-26 ENCOUNTER — OFFICE VISIT (OUTPATIENT)
Dept: FAMILY MEDICINE | Facility: CLINIC | Age: 54
End: 2024-12-26
Payer: COMMERCIAL

## 2024-12-26 VITALS — WEIGHT: 160 LBS | HEIGHT: 69 IN | BODY MASS INDEX: 23.7 KG/M2

## 2024-12-26 DIAGNOSIS — M51.369 L4-L5 DISC BULGE: ICD-10-CM

## 2024-12-26 DIAGNOSIS — M51.360 DEGENERATION OF INTERVERTEBRAL DISC OF LUMBAR REGION WITH DISCOGENIC BACK PAIN: Primary | Chronic | ICD-10-CM

## 2024-12-26 DIAGNOSIS — R93.7 ABNORMAL MRI, THORACIC SPINE: ICD-10-CM

## 2024-12-26 DIAGNOSIS — M51.46 SCHMORL'S NODES OF LUMBAR REGION: ICD-10-CM

## 2024-12-26 DIAGNOSIS — M48.061 FORAMINAL STENOSIS OF LUMBAR REGION: ICD-10-CM

## 2024-12-26 DIAGNOSIS — M51.44: ICD-10-CM

## 2024-12-26 DIAGNOSIS — M48.00 CENTRAL STENOSIS OF SPINAL CANAL: ICD-10-CM

## 2024-12-26 PROBLEM — N40.1 BENIGN LOCALIZED PROSTATIC HYPERPLASIA WITH LOWER URINARY TRACT SYMPTOMS (LUTS): Status: ACTIVE | Noted: 2024-11-06

## 2024-12-26 PROBLEM — Q53.112 UNILATERAL INGUINAL TESTIS: Status: ACTIVE | Noted: 2024-11-06

## 2024-12-26 PROBLEM — R39.12 WEAK URINARY STREAM: Status: ACTIVE | Noted: 2024-11-06

## 2024-12-26 PROCEDURE — 1159F MED LIST DOCD IN RCRD: CPT | Mod: CPTII,95,, | Performed by: FAMILY MEDICINE

## 2024-12-26 PROCEDURE — 1160F RVW MEDS BY RX/DR IN RCRD: CPT | Mod: CPTII,95,, | Performed by: FAMILY MEDICINE

## 2024-12-26 PROCEDURE — 3008F BODY MASS INDEX DOCD: CPT | Mod: CPTII,95,, | Performed by: FAMILY MEDICINE

## 2024-12-26 PROCEDURE — 99214 OFFICE O/P EST MOD 30 MIN: CPT | Mod: 95,,, | Performed by: FAMILY MEDICINE

## 2024-12-26 PROCEDURE — G2211 COMPLEX E/M VISIT ADD ON: HCPCS | Mod: 95,,, | Performed by: FAMILY MEDICINE

## 2024-12-26 NOTE — PROGRESS NOTES
Primary Care Telemedicine Note  The patient location is:  Patient's Home - [place]  The chief complaint leading to consultation is:   Chief Complaint   Patient presents with    Follow-up     MRI results.      Total time:  see MDM below. The total time spent on the encounter, which includes face to face time and non-face to face time preparing to see the patient (eg, review of previous medical records, tests), Obtaining and/or reviewing separately obtained history, documenting clinical information in the electronic or other health record, independently interpreting results (not separately reported)/communicating results to the patient/family/caregiver, and/or care coordination (not separately reported).    Visit type: Virtual visit with synchronous audio and video  Each patient to whom he or she provides medical services by telemedicine is:  (1) informed of the relationship between the physician and patient and the respective role of any other health care provider with respect to management of the patient; and (2) notified that he or she may decline to receive medical services by telemedicine and may withdraw from such care at any time.  =================================================================    PLAN:      Assessment & Plan  1. Degenerative disc disease.  The MRI of the lumbar spine done on 12/17/2024 without contrast shows well-maintained vertebral body heights and alignment, no suspicious marrow signals, and a normal conus. There is no spinal stenosis or foraminal narrowing at multiple levels. Mild facet degenerative joint disease and mild bilateral foraminal narrowing are present, with moderate narrowing at the lower lumbar levels. A disc bulge and desiccation are noted at L4-L5, with moderate facet DJD and mild bilateral foraminal narrowing. Similar findings are observed at L5-S1 with a central annular fissure. The patient reports lower sacral lumbar pain, which he has previously attributed to  prostate issues. He is advised to focus on core strengthening exercises such as planks and wall sits. A referral to Dr. Koby Alvarez, a neurosurgeon in [place], will be made for further evaluation. He is instructed to bring his MRI and CT scan discs to the appointment. If symptoms flare up, a steroid or epidural block may be considered.    2. 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  The thoracic MRI shows an extradural lesion at T9-T10 resulting in severe spinal stenosis, cord effacement, and cord edema. This could reflect extruded disc material or a posterior disc osteophyte complex. The patient reports no midback pain but has occasional pain in the lumbar and lower thoracic regions. He is advised to see Dr. Koby Alvarez for a comprehensive evaluation to rule out any serious conditions.    Problem List Items Addressed This Visit       Schmorl's thoracic nodes (Chronic)    Schmorl's nodes of lumbar region (Chronic)    Degeneration of intervertebral disc of lumbar region with discogenic back pain - Primary (Chronic)    L4-L5 disc bulge (Chronic)    Foraminal stenosis of lumbar region (Chronic)    Abnormal MRI, thoracic spine (Chronic)    Relevant Orders    Ambulatory referral/consult to Neurosurgery    Central stenosis of spinal canal (Chronic)    Relevant Orders    Ambulatory referral/consult to Neurosurgery     Future Appointments       Date Provider Specialty Appt Notes    1/7/2025 Chato Franklin MD Family Medicine  Arrive at: Telehealth Concerned about MRI findings. Plan moving forward. General health mobility and exercise concerns.           Medication Management for assessment above:   Medication List with Changes/Refills   Current Medications    CHOLECALCIFEROL, VITAMIN D3, (VITAMIN D3) 25 MCG (1,000 UNIT) CAPSULE    Take 1,000 Units by mouth.    CYANOCOBALAMIN (VITAMIN B-12) 1000 MCG TABLET    Take 1,000 mcg by mouth.    FAMOTIDINE (PEPCID) 40 MG TABLET    Take 1 tablet (40 mg total) by mouth once daily.     PANTOPRAZOLE (PROTONIX) 40 MG TABLET    Take 1 tablet (40 mg total) by mouth once daily. Okay to take every other day if needed.    PRASTERONE, DHEA, 25 MG CAP    Take 1 capsule by mouth.   Discontinued Medications    BIOTIN 1 MG CAP    Take by mouth.    CETIRIZINE (ZYRTEC) 10 MG TABLET    Take 10 mg by mouth daily as needed.    DIAZEPAM (VALIUM) 10 MG TAB    Take one tab 30 minutes prior to your scheduled time for the MRI.    MUPIROCIN (BACTROBAN) 2 % OINTMENT    Apply topically 3 (three) times daily. Until healed    TERBINAFINE HCL (LAMISIL) 250 MG TABLET    Take 1 tab PO weekly x 4 weeks.       Chato Franklin M.D.  ==========================================================================  Subjective:   Patient ID: Fortino Castro is a 54 y.o. male.  has a past medical history of GERD (gastroesophageal reflux disease) and Thyroid nodule (12/7/2019).   Chief Complaint: Follow-up (MRI results.)      History of Present Illness  The patient is a 54-year-old male who presents via virtual visit for MRI results. He was concerned about some findings of Schmorl's nodes seen on previous imaging. An MRI of the spine was ordered.    He has been experiencing lower sacral lumbar pain, which he attributes to prostate issues as informed by Dr. Acuna. He was also advised that a low back injury could potentially lead to prostate tightening. Over the past year, his primary concern has been pain over the SI joint and central lumbar region, which he describes as feeling like a misalignment in his back upon waking up some mornings. This discomfort has been limiting his daily activities, and he is seeking advice on exercises to avoid and those that could help strengthen his back. He is particularly interested in being able to lift his grandchild and carry heavy items without worry. He reports no midback pain but notes that his lumbar region and left SI joint can be aggravated. He recalls an incident last year where he may have  overexerted himself during a bench press exercise, leading to an overarched back. Since then, he has noticed thoracic issues when performing curls, feeling discomfort below his shoulder blades. He has not experienced any previous midback issues, with his lower back pain typically being associated with his prostate condition. He believes he had a strong core but has not been engaging in any core strengthening exercises for the past 7 or 8 months due to his back pain. He used to perform supermans, planks, and sit-ups but is unsure if these exercises are beneficial for his current condition.    Problem List Items Addressed This Visit       Schmorl's thoracic nodes (Chronic)    Schmorl's nodes of lumbar region (Chronic)    Overview     REASON FOR EXAM: [R10.9]-Unspecified abdominal pain    TECHNICAL FACTORS: Multiple contiguous axial CT images were obtained of the abdomen and pelvis without administration of intravenous contrast. 2D reformatted images were performed. Automated exposure control was utilized for radiation dose reduction.    COMPARISON: Renal stone CT 09/02/2009    ABDOMEN FINDINGS: The visualized lung bases are clear. The unenhanced liver, spleen, pancreas, adrenal glands, and kidneys are normal in appearance.  Contracted gallbladder. The small and large bowel are without evidence of obstruction or inflammation.  Mild colonic stool burden.  There is no evidence of free fluid or lymphadenopathy.  There are small Schmorl's nodes seen throughout the vertebral bodies of the lower thoracic and lumbar spine. No aggressive osseous lesion.    PELVIS FINDINGS: No abnormality the urinary bladder. There are a few coarse calcifications within a normal-sized prostate.  Normal appearance of the appendix (series 602 image 101).  No bowel obstruction or inflammation.  There is no evidence of free  fluid or lymphadenopathy.  Mild degenerative changes of the spine. There is a 2.7 x 1.3 cm oval fluid collection in the left  inguinal soft tissues overlying the rectus musculature (series 2 image 70). This appears unchanged.    IMPRESSION:   1.  No acute findings.     2.  Probable mild colonic constipation. No obstructive pattern.  3. Stable discrete cystic mass in the left groin. Given its stability dating back until 2009, a benign diagnosis is assigned.        Electronically signed by Adilson Willis MD on 10/29/2024 3:48 PM  Exam End: 10/29/24 14:52            Degeneration of intervertebral disc of lumbar region with discogenic back pain - Primary (Chronic)    Overview     Indication: [M51.46]-Schmorl's nodes, lumbar region / [M51.44]-Schmorl's nodes, thoracic region    Technique: Multiplanar T1 and T2 weighted images were obtained through the lumbar spine without administration of intravenous contrast.    Comparison: None    Findings:  Lumbar vertebral body heights and alignment are maintained. There is no suspicious marrow signal. The conus appears normal.    T12-L1: No spinal stenosis or foraminal narrowing. Mild facet DJD.    L1-L2: No spinal stenosis or foraminal narrowing. Mild facet DJD.    L2-L3: No spinal stenosis or foraminal narrowing. Mild facet DJD.    L3-L4: No spinal stenosis. Mild bilateral foraminal narrowing with moderate facet DJD.    L4-L5: Disc desiccation and diffuse disc bulge. No spinal stenosis. Moderate facet DJD with mild bilateral foraminal narrowing.    L5-S1: Disc desiccation and diffuse disc bulge with central annular fissure. No spinal stenosis. Moderate facet DJD with mild bilateral foraminal narrowing.    Impression:  Moderate lumbar DJD with multilevel foraminal narrowing as described.      Electronically signed by Yaakov Moore MD on 12/17/2024 9:07 AM  Exam End: 12/17/24 08:39            L4-L5 disc bulge (Chronic)    Foraminal stenosis of lumbar region (Chronic)    Abnormal MRI, thoracic spine (Chronic)    Central stenosis of spinal canal (Chronic)    Overview     Narrative    Indication:  [M51.46]-Schmorl's nodes, lumbar region / [M51.44]-Schmorl's nodes, thoracic region    Technique: Multiplanar T1 and T2 weighted images were obtained through the thoracic spine.    Comparison: None    Findings:  Thoracic vertebral body heights and alignment are maintained. There is no suspicious marrow signal. There is a an extradural lesion at T9-10 resulting in severe spinal stenosis, cord effacement, and cord edema. This is located in the central/left  subarticular zone. This could reflect extruded disc material. Posterior disc osteophyte complex seen at the level above results in mild canal narrowing. Otherwise, there is no significant spinal stenosis.    There is a large right thyroid nodule measuring 2.8 cm.    Impression:  Extradural lesion at T9-T10 resulting in severe spinal stenosis and cord edema with cord effacement. The lesion could reflect extruded disc material. Consider postcontrast imaging to exclude enhancement.    Right thyroid nodule, recommend follow-up ultrasound.    Electronically signed by Yaakov Moore MD on 12/17/2024 9:06 AM  Exam End: 12/17/24 08:31    Specimen Collected: 12/17/24 09:03                 Review of patient's allergies indicates:   Allergen Reactions    Clarithromycin Palpitations    Sulfa (sulfonamide antibiotics) Hives    Sulfamethoxazole-trimethoprim Hives and Itching     Current Outpatient Medications   Medication Instructions    cholecalciferol (vitamin D3) (VITAMIN D3) 1,000 Units    cyanocobalamin (VITAMIN B-12) 1,000 mcg    famotidine (PEPCID) 40 mg, Oral, Daily    pantoprazole (PROTONIX) 40 mg, Oral, Daily, Okay to take every other day if needed.    prasterone, dhea, 25 mg Cap 1 capsule      I have reviewed the PMH, social history, FamilyHx, surgical history, allergies and medications documented / confirmed by the patient at the time of this visit.  Review of Systems   Constitutional:  Negative for activity change and unexpected weight change.   HENT:  Positive for  "hearing loss. Negative for rhinorrhea and trouble swallowing.    Eyes:  Negative for discharge and visual disturbance.   Respiratory:  Negative for chest tightness and wheezing.    Cardiovascular:  Negative for chest pain and palpitations.   Gastrointestinal:  Negative for blood in stool, constipation, diarrhea and vomiting.   Endocrine: Negative for polydipsia and polyuria.   Genitourinary:  Negative for difficulty urinating, hematuria and urgency.   Musculoskeletal:  Positive for arthralgias. Negative for joint swelling and neck pain.   Neurological:  Positive for headaches. Negative for weakness.   Psychiatric/Behavioral:  Negative for confusion and dysphoric mood.      Objective:   Ht 5' 9" (1.753 m)   Wt 72.6 kg (160 lb)   BMI 23.63 kg/m²   Physical Exam  Constitutional:       General: He is not in acute distress.     Appearance: He is well-developed. He is not diaphoretic.   HENT:      Head: Normocephalic and atraumatic.   Eyes:      Extraocular Movements: Extraocular movements intact.      Pupils: Pupils are equal, round, and reactive to light.   Pulmonary:      Effort: Pulmonary effort is normal.   Musculoskeletal:         General: Normal range of motion.      Cervical back: Normal range of motion.   Skin:     Findings: No rash.   Neurological:      Mental Status: He is alert and oriented to person, place, and time.   Psychiatric:         Attention and Perception: He is attentive.         Mood and Affect: Mood normal. Mood is not anxious or depressed. Affect is not labile, blunt, angry or inappropriate.         Speech: He is communicative. Speech is not rapid and pressured, delayed, slurred or tangential.         Behavior: Behavior normal. Behavior is not agitated, slowed, aggressive, withdrawn, hyperactive or combative.         Thought Content: Thought content normal. Thought content is not paranoid or delusional. Thought content does not include homicidal or suicidal ideation. Thought content does not " include homicidal or suicidal plan.         Cognition and Memory: Memory is not impaired.         Judgment: Judgment normal. Judgment is not impulsive or inappropriate.       Physical Exam      Results  Imaging  MRI of the lumbar spine without contrast done on 12/17/2024 shows well maintained lumbar vertebral body heights and alignment, no suspicious marrow signals, normal conus, no spinal stenosis or foraminal narrowing at multiple levels, mild facet degenerative joint or disc disease, mild bilateral foraminal narrowing with moderate at the lower lumbar, disc bulge and some desiccation at L4-L5, no spinal stenosis, moderate facet DJD with mild bilateral foraminal narrowing, similar findings with the central annular fissure at L5-S1. Thoracic MRI shows maintained thoracic vertebral body heights and alignment, no suspicious marrow signals, an extradural lesion at T9, T10 resulting in severe spinal stenosis, cord effacement, and cord edema, located in the central to left subarticular zone, possibly reflecting extruded disc material, posterior disc osteophyte complex seen at the level above results in mild canal narrowing, otherwise no significant spinal stenosis.    Assessment:     1. Degeneration of intervertebral disc of lumbar region with discogenic back pain    2. L4-L5 disc bulge    3. Foraminal stenosis of lumbar region    4. Abnormal MRI, thoracic spine    5. Central stenosis of spinal canal    6. Schmorl's nodes of lumbar region    7. Schmorl's thoracic nodes      MDM:   Moderate medical complexity.  Moderate risk.  Total time: 31 minutes.  This includes total time spent on the encounter, which includes face to face time and non-face to face time preparing to see the patient (eg, review of previous medical records, tests), Obtaining and/or reviewing separately obtained history, documenting clinical information in the electronic or other health record, independently interpreting results (not separately  reported)/communicating results to the patient/family/caregiver, and/or care coordination (not separately reported).    I have Reviewed and summarized old records.  I have performed thorough medication reconciliation today and discussed risk and benefits of medications.  I have reviewed labs and discussed with patient.  All questions were answered.  I am requesting old records and will review them once they are available.  Visit today included increased complexity associated with the care of the episodic problem see above assessment addressed and managing the longitudinal care of the patient due to the serious and/or complex managed problem(s) see above.    I have signed for the following orders AND/OR meds.  Orders Placed This Encounter   Procedures    Ambulatory referral/consult to Neurosurgery     Standing Status:   Future     Standing Expiration Date:   1/26/2026     Referral Priority:   Routine     Referral Type:   Consultation     Referral Reason:   Specialty Services Required     Referred to Provider:   Koby Alvarez MD     Requested Specialty:   Neurosurgery     Number of Visits Requested:   1           Follow up in about 6 months (around 6/26/2025), or if symptoms worsen or fail to improve, for Med refills, LAB RESULTS.  Future Appointments       Date Provider Specialty Appt Notes    1/7/2025 Chato Franklin MD Family Medicine  Arrive at: Telehealth Concerned about MRI findings. Plan moving forward. General health mobility and exercise concerns.          If no improvement in symptoms or symptoms worsen, advised to call/follow-up at clinic or go to ER. Patient voiced understanding and all questions/concerns were addressed.   DISCLAIMER: This note was compiled by using a speech recognition dictation system and therefore please be aware that typographical / speech recognition errors can and do occur.  Please contact me if you see any errors specifically.  Consent was obtained for [employer] recording system  prior to the visit.    This note was generated with the assistance of ambient listening technology. Verbal consent was obtained by the patient and accompanying visitor(s) for the recording of patient appointment to facilitate this note. I attest to having reviewed and edited the generated note for accuracy, though some syntax or spelling errors may persist. Please contact the author of this note for any clarification.    Chato Franklin M.D.       Office: [phone] [address] [place] [address]  FAX: [phone]

## 2024-12-26 NOTE — PATIENT INSTRUCTIONS
Follow up in about 6 months (around 6/26/2025), or if symptoms worsen or fail to improve, for Med refills, LAB RESULTS.     Dear patient,   As a result of recent federal legislation (The Federal Cures Act), you may receive lab or pathology results from your visit in your [facility] account before your physician is able to contact you. Your physician or their representative will relay the results to you with their recommendations at their soonest availability.     If no improvement in symptoms or symptoms worsen, please be advised to call MD, follow-up at clinic and/or go to ER if becomes severe.    Chato Franklin M.D.        We Offer TELEHEALTH & Same Day Appointments!   Book your Telehealth appointment with me through my nurse or   Clinic appointments on [employer]!    [address] [place] [address]    Office: [phone]   FAX: [phone]    Check out my Facebook Page and Follow Me at: [email or web address]    Check out my website at [employer] by clicking on: [email or web address]    To Schedule appointments online, go to [employer]: [email or web address]

## 2025-01-15 ENCOUNTER — OFFICE VISIT (OUTPATIENT)
Dept: NEUROSURGERY | Facility: CLINIC | Age: 55
End: 2025-01-15
Payer: COMMERCIAL

## 2025-01-15 VITALS — WEIGHT: 160.06 LBS | BODY MASS INDEX: 23.71 KG/M2 | HEIGHT: 69 IN

## 2025-01-15 DIAGNOSIS — R93.7 ABNORMAL MRI, THORACIC SPINE: ICD-10-CM

## 2025-01-15 DIAGNOSIS — M48.00 CENTRAL STENOSIS OF SPINAL CANAL: ICD-10-CM

## 2025-01-15 DIAGNOSIS — M54.6 PAIN IN THORACIC SPINE: Primary | ICD-10-CM

## 2025-01-15 PROCEDURE — 99205 OFFICE O/P NEW HI 60 MIN: CPT | Mod: S$GLB,,, | Performed by: STUDENT IN AN ORGANIZED HEALTH CARE EDUCATION/TRAINING PROGRAM

## 2025-01-15 PROCEDURE — 3008F BODY MASS INDEX DOCD: CPT | Mod: CPTII,S$GLB,, | Performed by: STUDENT IN AN ORGANIZED HEALTH CARE EDUCATION/TRAINING PROGRAM

## 2025-01-15 PROCEDURE — 1159F MED LIST DOCD IN RCRD: CPT | Mod: CPTII,S$GLB,, | Performed by: STUDENT IN AN ORGANIZED HEALTH CARE EDUCATION/TRAINING PROGRAM

## 2025-01-15 NOTE — PROGRESS NOTES
[facility] - [facility]  Clinic Consult     Consult Requested By: Chato Franklin MD, Chato Franklin MD        SUBJECTIVE:     Chief Complaint: No chief complaint on file.      History of Present Illness:  Fortino Castro is a 54 y.o. male who presents with referral for thoracic disc herniation.  He is very active.  He had some intermittent back pain.  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  This has been very manageable the persistent.  He states that he was having some back pain, but also prostate issues a CT chest abdomen pelvis, noticed some Schmorl nodules which led to further workup and combination with his back pain.    He denies any lower extremity sensory symptoms, weakness imbalance  Or other symptoms that are concerning to him    The MRI of the thoracic spine at T9-10 shows a very central what appears to be disc herniation displacing the thecal sac and abutting the spinal cord.  There is no dorsal compression with ample CSF  There is a question of cord signal change though not overt per my review    There is a recommendation of a contrast scan    He had questions as he had a family member with a history of cancer involving his spinal column        Pertinent and Recent history, provider evaluations, imaging and data reviewed in EPIC         Other pertinent ROS negative      Diagnostic Results:  I have independently reviewed the following imaging:      Impression:  Extradural lesion at T9-T10 resulting in severe spinal stenosis and cord edema with cord effacement. The lesion could reflect extruded disc material. Consider postcontrast imaging to exclude enhancement.    Right thyroid nodule, recommend follow-up ultrasound.    Electronically signed by Yaakov Moore MD on 12/17/2024 9:06 AM  Exam End: 12/17/24 08:31    Specimen Collected: 12/17/24 09:03          Review of patient's allergies indicates:   Allergen Reactions    Clarithromycin Palpitations     Sulfa (sulfonamide antibiotics) Hives    Sulfamethoxazole-trimethoprim Hives and Itching       Past Medical History:   Diagnosis Date    GERD (gastroesophageal reflux disease)     Thyroid nodule 12/7/2019     Past Surgical History:   Procedure Laterality Date    HERNIA REPAIR       Family History   Problem Relation Name Age of Onset    Arrhythmia Mother      Alzheimer's disease Father       Social History     Tobacco Use    Smoking status: Never    Smokeless tobacco: Former   Substance Use Topics    Alcohol use: Not Currently    Drug use: Never        Review of Systems:      Constitutional: no fever, chills or night sweats. No abrupt changes in weight   Eyes: no diplopia, lid drooping, loss of vision   ENT: no nasal congestion, sore throat, discharge  Respiratory: no cough, shortness of breath, or pain  Cardiovascular: no chest pain or palpitations   Gastrointestinal: no nausea or vomiting      OBJECTIVE:     Vital Signs (Most Recent):       Physical Exam:      General: well developed, well nourished, no distress. .  Mental Status: Awake, Alert, Oriented x 4  Language: No aphasia  Speech: No dysarthria  Head: normocephalic, atraumatic.  Neck: trachea midline, no JVD   Cardiovascular: no LE edema  Pulmonary: normal respirations, no signs of respiratory distress      Motor Strength:  No abnormal movements seen.     Strength  Deltoids Triceps Biceps Wrist Extension Wrist Flexion Hand  Interossei     Upper: R 5/5 5/5 5/5 5/5 5/5 5/5 5/5      L 5/5 5/5 5/5 5/5 5/5 5/5 5/5       Iliopsoas Quadriceps  Tibialis  anterior Gastro- cnemius EHL      Lower: R 5/5 5/5  5/5 5/5 5/5       L 5/5 5/5  5/5 5/5 5/5        SILT,PP      DTR's: 2 + and symmetric in UE and LE  Webber: absent  Clonus: absent    Sit to Stand: normal  Gait: normal    Rhomberg: negative                   ASSESSMENT/PLAN:     Abnormal MRI, thoracic spine  -     Ambulatory referral/consult to Neurosurgery    Central stenosis of spinal canal  -      Ambulatory referral/consult to Neurosurgery      54-year-old gentleman with a about 7 months of back pain.  However he is very active, working  Has no signs or symptoms of thoracic myelopathy.  We reviewed his MRI which shows a T9-10 which appears to be a very central disc herniation displacing the thecal sac abutting the cord.  Question of cord signal change.  Clinically clinically he denies any concerning symptoms  I he has questions had a family member with spinal column tumor recommendations from the radiologist were contrast scan.  I think this is low likely to be anything other in his consistent with a degenerative disc herniation; however we will proceed with baseline complete imaging which will include a thoracic MRI with contrast as well as a CT scan to evaluate any calcification or chronicity.    Reviewed treatment options in detail with regard to the mass effect treatment which would require surgery;  however he has no concerning symptoms is very active and at this time we would like to proceed with surveillance.  We reviewed progression risk, activity modification  And signs and symptoms needing medical evaluation    We also reviewed technical surgical experience and scope of practice    He will proceed with the following  1 MRI contrast thoracic spine, as well as CT scan  2. Surveillance and activity modification  3. Complex spine referral to Dr. Ferguson for thoracic disc herniation                                      Koby Alvarez MD  Neurosurgery

## 2025-01-28 ENCOUNTER — PATIENT MESSAGE (OUTPATIENT)
Dept: NEUROSURGERY | Facility: CLINIC | Age: 55
End: 2025-01-28
Payer: COMMERCIAL

## 2025-01-30 ENCOUNTER — PATIENT MESSAGE (OUTPATIENT)
Dept: NEUROSURGERY | Facility: CLINIC | Age: 55
End: 2025-01-30
Payer: COMMERCIAL

## 2025-02-04 ENCOUNTER — TELEPHONE (OUTPATIENT)
Dept: NEUROSURGERY | Facility: CLINIC | Age: 55
End: 2025-02-04
Payer: COMMERCIAL

## 2025-02-05 NOTE — TELEPHONE ENCOUNTER
Called pt. 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  Pt denies having any s/s.  Deysi starkey NP notified.  Will try to r/s pt. As soon as possible.

## 2025-02-05 NOTE — TELEPHONE ENCOUNTER
----- Message from Deysi Lewis NP sent at 2/4/2025  9:09 AM CST -----  Yes, soonish. He can hold off on the other scans. Dr. Carballo will still want them to look for other lesions but if the patient wants to hear that from him he can wait.  ----- Message -----  From: Gail Martin RN  Sent: 2/3/2025   6:41 PM CST  To: Deysi Lewis NP; Gail Martin RN    In re viewing this. Further in the note from antonio, the radiologist interp.said pt. Has extradural LESION at T9-10 causing severe stenosis with cord edema.  Can you look at that again.  When I told the pt. To send us the new imaging that antonio ordered, he said he may wait a while to get b/c of cost.  Wanted fernando to review what he has and give opinion.  I told him fernando will review the imaging when pt. Comes for appt. However now that I am seeing lesion and edema, do we need to try to get in stat.  And the MRI of T,L spine from [facility] is uploaded and it was only in dec. As well as a previous CT scan.  Please let me know if we gotta get him in soonish  ----- Message -----  From: Deysi Lewis NP  Sent: 1/28/2025  11:15 AM CST  To: Gail Martin RN    Looks like Antonio ordered updated scans? He should get a scoli xray and those new scans.  ----- Message -----  From: Gail Martin, BRIGETTE  Sent: 1/27/2025   8:07 PM CST  To: Deysi Lewis NP; Gail Martin RN; #    Being ref. By antonio for t9-10 disc cha.  What other imaging needed before we schedule pt. With dr. MERA  There are some outside mri's uploaded  ----- Message -----  From: Shauna Noel LPN  Sent: 1/24/2025  11:27 AM CST  To: Fernando Reynaga

## 2025-02-12 ENCOUNTER — PATIENT MESSAGE (OUTPATIENT)
Dept: NEUROSURGERY | Facility: CLINIC | Age: 55
End: 2025-02-12
Payer: COMMERCIAL

## 2025-02-12 ENCOUNTER — PATIENT MESSAGE (OUTPATIENT)
Dept: FAMILY MEDICINE | Facility: CLINIC | Age: 55
End: 2025-02-12
Payer: COMMERCIAL

## 2025-03-10 ENCOUNTER — PATIENT MESSAGE (OUTPATIENT)
Dept: FAMILY MEDICINE | Facility: CLINIC | Age: 55
End: 2025-03-10
Payer: COMMERCIAL

## 2025-03-13 ENCOUNTER — PATIENT MESSAGE (OUTPATIENT)
Dept: NEUROSURGERY | Facility: CLINIC | Age: 55
End: 2025-03-13
Payer: COMMERCIAL

## 2025-03-14 ENCOUNTER — TELEPHONE (OUTPATIENT)
Dept: NEUROSURGERY | Facility: CLINIC | Age: 55
End: 2025-03-14
Payer: COMMERCIAL

## 2025-03-14 DIAGNOSIS — M48.00 CENTRAL STENOSIS OF SPINAL CANAL: Primary | ICD-10-CM

## 2025-03-14 DIAGNOSIS — R93.7 ABNORMAL MRI, THORACIC SPINE: ICD-10-CM

## 2025-03-20 ENCOUNTER — OFFICE VISIT (OUTPATIENT)
Dept: FAMILY MEDICINE | Facility: CLINIC | Age: 55
End: 2025-03-20
Payer: COMMERCIAL

## 2025-03-20 DIAGNOSIS — E78.5 HYPERLIPIDEMIA, UNSPECIFIED HYPERLIPIDEMIA TYPE: ICD-10-CM

## 2025-03-20 DIAGNOSIS — E04.1 THYROID NODULE: ICD-10-CM

## 2025-03-20 DIAGNOSIS — Z13.6 ENCOUNTER FOR SCREENING FOR CARDIOVASCULAR DISORDERS: ICD-10-CM

## 2025-03-20 DIAGNOSIS — R93.7 ABNORMAL MRI, THORACIC SPINE: Chronic | ICD-10-CM

## 2025-03-20 DIAGNOSIS — N40.1 BENIGN LOCALIZED PROSTATIC HYPERPLASIA WITH LOWER URINARY TRACT SYMPTOMS (LUTS): Primary | ICD-10-CM

## 2025-03-20 DIAGNOSIS — Z79.899 ENCOUNTER FOR LONG-TERM (CURRENT) USE OF MEDICATIONS: ICD-10-CM

## 2025-03-20 DIAGNOSIS — R53.83 FATIGUE, UNSPECIFIED TYPE: ICD-10-CM

## 2025-03-20 DIAGNOSIS — Z12.5 ENCOUNTER FOR PROSTATE CANCER SCREENING: ICD-10-CM

## 2025-03-20 DIAGNOSIS — Z11.59 NEED FOR HEPATITIS C SCREENING TEST: ICD-10-CM

## 2025-03-20 DIAGNOSIS — R39.12 WEAK URINARY STREAM: ICD-10-CM

## 2025-03-20 RX ORDER — SILODOSIN 4 MG/1
4 CAPSULE ORAL DAILY
COMMUNITY
Start: 2025-03-07

## 2025-03-20 NOTE — PATIENT INSTRUCTIONS
Follow up in about 6 months (around 9/20/2025), or if symptoms worsen or fail to improve.     Dear patient,   As a result of recent federal legislation (The Federal Cures Act), you may receive lab or pathology results from your visit in your [facility] account before your physician is able to contact you. Your physician or their representative will relay the results to you with their recommendations at their soonest availability.     If no improvement in symptoms or symptoms worsen, please be advised to call MD, follow-up at clinic and/or go to ER if becomes severe.    Chato Franklin M.D.        We Offer TELEHEALTH & Same Day Appointments!   Book your Telehealth appointment with me through my nurse or   Clinic appointments on [employer]!    [address] [place] [address]    Office: [phone]   FAX: [phone]    Check out my Facebook Page and Follow Me at: [email or web address]    Check out my website at [employer] by clicking on: [email or web address]    To Schedule appointments online, go to [email or web address]: [email or web address]

## 2025-03-20 NOTE — ASSESSMENT & PLAN NOTE
Counseled on hyperlipidemia disease course, healthy diet and increased need for exercise.  Please be advised of the risk of cardiovascular disease, increase stroke and heart attack risk with uncontrolled/untreated hyperlipidemia.     Patient voiced understanding and understood the treatment plan. All questions were answered.

## 2025-03-21 NOTE — PROGRESS NOTES
Primary Care Telemedicine Note  The patient location is:  Patient's Home - [place]  The chief complaint leading to consultation is:   Chief Complaint   Patient presents with    Follow-up      Total time:  see MDM below. The total time spent on the encounter, which includes face to face time and non-face to face time preparing to see the patient (eg, review of previous medical records, tests), Obtaining and/or reviewing separately obtained history, documenting clinical information in the electronic or other health record, independently interpreting results (not separately reported)/communicating results to the patient/family/caregiver, and/or care coordination (not separately reported).    Visit type: Virtual visit with synchronous audio and video  Each patient to whom he or she provides medical services by telemedicine is:  (1) informed of the relationship between the physician and patient and the respective role of any other health care provider with respect to management of the patient; and (2) notified that he or she may decline to receive medical services by telemedicine and may withdraw from such care at any time.  =================================================================  PLAN:      Assessment & Plan  1. Elevated LDL cholesterol.  His LDL cholesterol level was reported as 126 mg/dL from the February blood work. He is advised to continue efforts to lower his LDL through diet and exercise.    2. Elevated ALT.  His ALT level was reported as 38 U/L, which is mildly elevated but within normal range at our facility. No immediate intervention is required.    3. Prostate issues.  He experienced increased heart rate and discomfort after taking Rapaflo 4 mg at night for three days. He is advised to discontinue Rapaflo if symptoms persist and follow up with his cardiologist sooner if needed.    4. Rapid heartbeat.  He reports occasional episodes of waking up with a rapid heartbeat, which are infrequent and  managed by drinking water. A CT calcium score test will be ordered to assess for coronary artery disease. If the results are borderline or elevated, a follow-up consultation will be necessary.    5. Elbow epicondylitis.  He is currently undergoing physical therapy for elbow epicondylitis.    6. Spinal stenosis.  He has a slipped disc between T9 and T10 causing edema and spinal stenosis. An x-ray for scoliosis, including supine and erect positions, will be scheduled at our facility.    7. Health maintenance.  Blood work orders have been placed with [facility] for PSA and testosterone levels. He is advised to check with his insurance to see if labs can be covered at our clinic to streamline future tests.    8. Anxiety.  He reports waking up anxious.    Problem List Items Addressed This Visit       Thyroid nodule (Chronic)    Hyperlipidemia (Chronic)    Counseled on hyperlipidemia disease course, healthy diet and increased need for exercise.  Please be advised of the risk of cardiovascular disease, increase stroke and heart attack risk with uncontrolled/untreated hyperlipidemia.     Patient voiced understanding and understood the treatment plan. All questions were answered.           Relevant Orders    Lipid Panel    Encounter for long-term (current) use of medications (Chronic)    Complete history and physical was completed today.  Complete and thorough medication reconciliation was performed.  Discussed risks and benefits of medications.  Advised patient on orders and health maintenance.  We discussed old records and old labs if available.  Will request any records not available through epic.  Continue current medications listed on your summary sheet.           Relevant Orders    Testosterone    Comprehensive Metabolic Panel    Lipid Panel    Hemoglobin A1C    CBC Without Differential    Comprehensive Metabolic Panel    TSH    Testosterone, Total, Males    Benign localized prostatic hyperplasia with lower urinary tract  symptoms (LUTS) - Primary (Chronic)    Follow with urology.          Weak urinary stream (Chronic)    Abnormal MRI, thoracic spine (Chronic)    Fatigue (Chronic)    Relevant Orders    Testosterone, Total, Males     Other Visit Diagnoses         Need for hepatitis C screening test        Relevant Orders    Hepatitis C Antibody      Encounter for prostate cancer screening        Relevant Orders    PSA, Screening      Encounter for screening for cardiovascular disorders        Relevant Orders    CT Cardiac Scoring          Future Appointments       Date Provider Specialty Appt Notes    5/22/2025  Radiology spine lesion    5/22/2025 Emerson Carballo MD Neurosurgery ref by [place]/ lesion t9-10, stenosis with cord edema           Medication Management for assessment above:   Medication List with Changes/Refills   Current Medications    CHOLECALCIFEROL, VITAMIN D3, (VITAMIN D3) 25 MCG (1,000 UNIT) CAPSULE    Take 1,000 Units by mouth.    CYANOCOBALAMIN (VITAMIN B-12) 1000 MCG TABLET    Take 1,000 mcg by mouth.    FAMOTIDINE (PEPCID) 40 MG TABLET    Take 1 tablet (40 mg total) by mouth once daily.    PANTOPRAZOLE (PROTONIX) 40 MG TABLET    Take 1 tablet (40 mg total) by mouth once daily. Okay to take every other day if needed.    PRASTERONE, DHEA, 25 MG CAP    Take 1 capsule by mouth.    SILODOSIN (RAPAFLO) 4 MG CAP CAPSULE    Take 4 mg by mouth once daily.       Chato Franklin M.D.  ==========================================================================  Subjective:   Patient ID: Fortino Castro is a 54 y.o. male.  has a past medical history of GERD (gastroesophageal reflux disease) and Thyroid nodule (12/7/2019).   Chief Complaint: Follow-up      History of Present Illness  The patient is a pleasant 54-year-old male being seen by telemedicine for review of chronic medical conditions, review of imaging results, discussion about his thyroid condition, future blood work, and management of anxiety.    He has been  experiencing episodes of waking up with an elevated heart rate, which he manages by hydrating and returning to sleep. These episodes have occurred three times since starting Rapaflo. He has not had a cardiology consultation in several years and has not undergone any cardiac evaluations. He occasionally experiences tachycardia upon awakening, which is alleviated by hydration. These episodes are infrequent. He was previously under the care of Dr. Mustafa.    He initiated Rapaflo therapy three days ago, taking 4 mg at night, to assess its efficacy in improving bladder emptying. However, he discontinued the medication due to concerns about potential hypotensive effects, as he typically has low blood pressure. The medication did not appear to improve his urinary symptoms during the brief period of use.    He has been advised to undergo a CT calcium score test, as it has been a significant amount of time since his last one.    He is currently undergoing physical therapy for epicondylitis. He has a herniated disc between T9 and T10, which has resulted in edema and spinal stenosis. Despite the absence of abnormal reflexes, numbness, or tingling, Dr. Alvarez has recommended further evaluation to establish a baseline.    He has expressed a preference for having his PSA and testosterone levels checked through our clinic. He is currently on a regimen of DHEA 50 mg daily, which he takes prior to his testosterone tests. His testosterone levels have previously been recorded as low, with a reading of 390.    He has been advised to obtain a standing scoliosis x-ray from Dr. Blanco but prefers to have this done at our facility.    He has been getting blood work done at Dr. Almonte's and Dr. Brownlee's offices. He had blood work done in 02/2025, which showed an LDL level of 126 and an ALT level of 38. He wants to get his PSA and testosterone levels checked through our office. He is on DHEA 50 mg daily. He needs to get a standing scoliosis  x-ray from Dr. Montes. He started Rapaflo 4 mg at night for 3 days to see if it would help him empty his bladder. He woke up with a slightly higher heart rate than usual and had to drink some water to calm down. This happened 3 nights after starting it, so he discontinued it to make sure he would be calm during the MRI. He has not seen his cardiologist in years and has not had any kind of checkup for his cardio issues. Dr. Almonte asked him to check with us about a calcium score CT scan because he has not had it done in a long time. He saw Dr. Vargas years ago. He sometimes wakes up with a slightly rapid heartbeat, but it usually goes away after drinking water. He is doing physical therapy for his elbow due to epicondylitis. He has a slipped disc between T9 and T10, which has caused edema and spinal stenosis. Dr. Alvarez did not find anything that warranted further investigation, but he wanted to check it anyway. He does not have any bad reflexes, numbness, or tingling. He will see Dr. Alvarez in 05/2025.    MEDICATIONS  Current: DHEA  Discontinued: Rapaflo    Problem List Items Addressed This Visit       Thyroid nodule (Chronic)    Overview   Followed by ENDO- Dr. Almonte , FNA thyroid with Dr. Cat at [facility] , 3 negative biopsies  US FINE NEEDLE ASPIRATION W GUIDE   CLINICAL INFORMATION: Right thyroid nodule.   PROCEDURE: Ultrasound guided right thyroid fine needle aspiration.   : Kevon.   MEDICATIONS: Lidocaine 1%.   TECHNIQUE: Procedure, including risks and benefits, was discussed with    patient. Informed consent was obtained. Patient was placed supine. The    skin overlying the neck was prepped and draped in the usual sterile    fashion using ChloraPrep. A time-out was performed. Lidocaine was    administered for local anesthesia. Under ultrasound guidance, fine needle    aspiration was performed of the dominant nodule within the right thyroid    lobe. A total of five passes were performed using  "27-gauge needles. A    Band-Aid was placed over the incision. Patient tolerated the procedure    without immediate complications.     IMPRESSION:   Successful ultrasound guided fine needle aspiration of right thyroid    nodule. Cytology is pending.   AIDAN MCCARTY M.D.   ELECTRONICALLY SIGNED ON 06/05/2014 10:29:16          Hyperlipidemia (Chronic)    Overview   CHRONIC. STABLE. Lab analysis reviewed.   (-) CP, SOB, abdominal pain, N/V/D, constipation, jaundice, skin changes.  (-) Myalgias  Lab Results   Component Value Date    CHOL 185 03/16/2023    CHOL 224 (H) 01/05/2021     Lab Results   Component Value Date    HDL 48 (L) 03/16/2023    HDL 62 01/05/2021     Lab Results   Component Value Date    LDLCALC 117 (H) 03/16/2023    LDLCALC 147 (H) 01/05/2021     Lab Results   Component Value Date    TRIG 97 03/16/2023    TRIG 55 01/05/2021     Lab Results   Component Value Date    CHOLHDL 3.6 01/05/2021     No results found for: "TOTALCHOLEST"  Lab Results   Component Value Date    ALT 27 03/16/2023    AST 21 03/16/2023    ALKPHOS 55 03/16/2023    BILITOT 0.8 03/16/2023     ======================================================  The 10-year ASCVD risk score (Mary KWON, et al., 2019) is: 2.8%    Values used to calculate the score:      Age: 54 years      Sex: Male      Is Non- : No      Diabetic: No      Tobacco smoker: No      Systolic Blood Pressure: 99 mmHg      Is BP treated: No      HDL Cholesterol: 48 mg/dL      Total Cholesterol: 185 mg/dL           Current Assessment & Plan   Counseled on hyperlipidemia disease course, healthy diet and increased need for exercise.  Please be advised of the risk of cardiovascular disease, increase stroke and heart attack risk with uncontrolled/untreated hyperlipidemia.     Patient voiced understanding and understood the treatment plan. All questions were answered.           Encounter for long-term (current) use of medications (Chronic)    Overview "   CHRONIC. Stable. Compliant with medications for managed conditions. See medication list. No SE reported.   Routine lab analysis is being monitored. Refills were addressed.  Lab Results   Component Value Date    WBC 6.2 01/05/2021    HGB 15.7 01/05/2021    HCT 47.6 01/05/2021    MCV 91.4 01/05/2021     01/05/2021         Chemistry        Component Value Date/Time     03/16/2023 0000    K 4.0 03/16/2023 0000     03/16/2023 0000    CO2 27 03/16/2023 0000    BUN 16 03/16/2023 0000    CREATININE 1.1 (H) 03/16/2023 0000    GLU 88 03/16/2023 0000        Component Value Date/Time    CALCIUM 9.4 03/16/2023 0000    ALKPHOS 55 03/16/2023 0000    AST 21 03/16/2023 0000    ALT 27 03/16/2023 0000    BILITOT 0.8 03/16/2023 0000    ESTGFRAFRICA 116 01/05/2021 0729    EGFRNONAA 100 01/05/2021 0729          Lab Results   Component Value Date    TSH 0.73 01/05/2021    T3FREE 3.5 01/05/2021              Current Assessment & Plan   Complete history and physical was completed today.  Complete and thorough medication reconciliation was performed.  Discussed risks and benefits of medications.  Advised patient on orders and health maintenance.  We discussed old records and old labs if available.  Will request any records not available through epic.  Continue current medications listed on your summary sheet.           Benign localized prostatic hyperplasia with lower urinary tract symptoms (LUTS) - Primary (Chronic)    Overview   Chronic. On Rapaflo thru urology.          Current Assessment & Plan   Follow with urology.          Weak urinary stream (Chronic)    Abnormal MRI, thoracic spine (Chronic)    Overview   REASON FOR EXAM: [M54.6]-Pain in thoracic spine    TECHNICAL FACTORS: Multiplanar, multisequence MRI of the thoracic spine was performed before and after administration of intravenous contrast.    COMPARISON: 12/17/2024    FINDINGS: There is a 2.5 cm heterogeneous high T2 signal enhancing right thyroid lobe  nodule which may be further assessed with elective thyroid ultrasound. Thoracic vertebral body heights and alignment appear maintained. Intervertebral disc space  heights and signal intensity are preserved. There is a small left paracentral disc protrusion at T8-9 without significant canal stenosis. At T9-T10, there is a left paracentral disc protrusion measuring 0.7 x 0.8 cm axial dimension spanning a length of  1.1 cm. This results in effacement of the ventral aspect of the thecal sac and impingement on the underlying cord. There is severe canal stenosis and cord edema. No enhancing lesion is identified. No foraminal narrowing.  The cord is otherwise normal in  signal intensity and caliber.    IMPRESSION:   1.  Extradural lesion at the T9-T10 level indicates a fairly large disc protrusion. There is resultant severe canal stenosis and cord impingement with mild edema.   2.  Largest right thyroid lobe nodule. Follow-up ultrasound is recommended.     Electronically signed by Adilson Willis MD on 3/12/2025 10:02 AM  Exam End: 03/12/25 08:52            Fatigue (Chronic)     Other Visit Diagnoses         Need for hepatitis C screening test          Encounter for prostate cancer screening          Encounter for screening for cardiovascular disorders                 Review of patient's allergies indicates:   Allergen Reactions    Clarithromycin Palpitations    Sulfa (sulfonamide antibiotics) Hives    Sulfamethoxazole-trimethoprim Hives and Itching     Current Outpatient Medications   Medication Instructions    cholecalciferol (vitamin D3) (VITAMIN D3) 1,000 Units    cyanocobalamin (VITAMIN B-12) 1,000 mcg    famotidine (PEPCID) 40 mg, Oral, Daily    pantoprazole (PROTONIX) 40 mg, Oral, Daily, Okay to take every other day if needed.    prasterone, dhea, 25 mg Cap 1 capsule    silodosin (RAPAFLO) 4 mg, Daily      I have reviewed the PMH, social history, FamilyHx, surgical history, allergies and medications documented /  confirmed by the patient at the time of this visit.  Review of Systems   Constitutional:  Negative for activity change and unexpected weight change.   HENT:  Negative for hearing loss, rhinorrhea and trouble swallowing.    Eyes:  Negative for discharge and visual disturbance.   Respiratory:  Negative for chest tightness and wheezing.    Cardiovascular:  Positive for palpitations. Negative for chest pain.   Gastrointestinal:  Negative for blood in stool, constipation, diarrhea and vomiting.   Endocrine: Negative for polydipsia and polyuria.   Genitourinary:  Negative for difficulty urinating, hematuria and urgency.   Musculoskeletal:  Negative for arthralgias, joint swelling and neck pain.   Neurological:  Negative for weakness and headaches.   Psychiatric/Behavioral:  Negative for confusion and dysphoric mood.      Objective:   There were no vitals taken for this visit.  Physical Exam  Constitutional:       General: He is not in acute distress.     Appearance: He is well-developed. He is not diaphoretic.   HENT:      Head: Normocephalic and atraumatic.   Eyes:      Extraocular Movements: Extraocular movements intact.      Pupils: Pupils are equal, round, and reactive to light.   Pulmonary:      Effort: Pulmonary effort is normal.   Musculoskeletal:         General: Normal range of motion.      Cervical back: Normal range of motion.   Skin:     Findings: No rash.   Neurological:      Mental Status: He is alert and oriented to person, place, and time.   Psychiatric:         Attention and Perception: He is attentive.         Mood and Affect: Mood normal. Mood is not anxious or depressed. Affect is not labile, blunt, angry or inappropriate.         Speech: He is communicative. Speech is not rapid and pressured, delayed, slurred or tangential.         Behavior: Behavior normal. Behavior is not agitated, slowed, aggressive, withdrawn, hyperactive or combative.         Thought Content: Thought content normal. Thought  content is not paranoid or delusional. Thought content does not include homicidal or suicidal ideation. Thought content does not include homicidal or suicidal plan.         Cognition and Memory: Memory is not impaired.         Judgment: Judgment normal. Judgment is not impulsive or inappropriate.       Physical Exam      Results  Laboratory Studies  LDL cholesterol was 126 in 02/2025. ALT was 38.    Assessment:     1. Benign localized prostatic hyperplasia with lower urinary tract symptoms (LUTS)    2. Weak urinary stream    3. Encounter for long-term (current) use of medications    4. Hyperlipidemia, unspecified hyperlipidemia type    5. Need for hepatitis C screening test    6. Fatigue, unspecified type    7. Encounter for prostate cancer screening    8. Encounter for screening for cardiovascular disorders    9. Abnormal MRI, thoracic spine    10. Thyroid nodule      MDM:   Moderate medical complexity.  Moderate risk.  Total time: 31 minutes.  This includes total time spent on the encounter, which includes face to face time and non-face to face time preparing to see the patient (eg, review of previous medical records, tests), Obtaining and/or reviewing separately obtained history, documenting clinical information in the electronic or other health record, independently interpreting results (not separately reported)/communicating results to the patient/family/caregiver, and/or care coordination (not separately reported).    I have Reviewed and summarized old records.  I have performed thorough medication reconciliation today and discussed risk and benefits of medications.  I have reviewed labs and discussed with patient.  All questions were answered.  I am requesting old records and will review them once they are available.  Endocrinology, Urology, neuro surgery  Visit today included increased complexity associated with the care of the episodic problem see above assessment addressed and managing the longitudinal  care of the patient due to the serious and/or complex managed problem(s) see above.    I have signed for the following orders AND/OR meds.  Orders Placed This Encounter   Procedures    CT Cardiac Scoring     Standing Status:   Future     Expected Date:   3/20/2025     Expiration Date:   3/20/2026     May the Radiologist modify the order per protocol to meet the clinical needs of the patient?:   Yes    Testosterone     Standing Status:   Future     Expected Date:   3/20/2025     Expiration Date:   5/19/2026    PSA, Screening     Standing Status:   Future     Number of Occurrences:   1     Expected Date:   3/20/2025     Expiration Date:   3/21/2026     Send normal result to authorizing provider's In Basket if patient is active on MyChart::   Yes    Comprehensive Metabolic Panel     Standing Status:   Future     Number of Occurrences:   1     Expected Date:   3/20/2025     Expiration Date:   5/19/2026     Send normal result to authorizing provider's In Basket if patient is active on MyChart::   Yes    Lipid Panel     Standing Status:   Future     Number of Occurrences:   1     Expected Date:   3/20/2025     Expiration Date:   5/19/2026    Hemoglobin A1C     Standing Status:   Future     Number of Occurrences:   1     Expected Date:   3/20/2025     Expiration Date:   5/19/2026    CBC Without Differential     Standing Status:   Future     Number of Occurrences:   1     Expected Date:   3/20/2025     Expiration Date:   5/19/2026    Comprehensive Metabolic Panel     Standing Status:   Future     Number of Occurrences:   1     Expected Date:   3/20/2025     Expiration Date:   5/19/2026    TSH     Standing Status:   Future     Number of Occurrences:   1     Expected Date:   3/20/2025     Expiration Date:   5/19/2026    Hepatitis C Antibody     Standing Status:   Future     Number of Occurrences:   1     Expected Date:   3/20/2025     Expiration Date:   5/19/2026     Release to patient:   Immediate     Send normal result to  authorizing provider's In Basket if patient is active on MyChart::   Yes    Testosterone, Total, Males     Standing Status:   Future     Number of Occurrences:   1     Expected Date:   3/20/2025     Expiration Date:   5/19/2026     Send normal result to authorizing provider's In Basket if patient is active on MyChart::   Yes           Follow up in about 6 months (around 9/20/2025), or if symptoms worsen or fail to improve.  Future Appointments       Date Provider Specialty Appt Notes    5/22/2025  Radiology spine lesion    5/22/2025 Emerson Carballo MD Neurosurgery ref by [place]/ lesion t9-10, stenosis with cord edema          If no improvement in symptoms or symptoms worsen, advised to call/follow-up at clinic or go to ER. Patient voiced understanding and all questions/concerns were addressed.   DISCLAIMER: This note was compiled by using a speech recognition dictation system and therefore please be aware that typographical / speech recognition errors can and do occur.  Please contact me if you see any errors specifically.  Consent was obtained for [employer] recording system prior to the visit.    This note was generated with the assistance of ambient listening technology. Verbal consent was obtained by the patient and accompanying visitor(s) for the recording of patient appointment to facilitate this note. I attest to having reviewed and edited the generated note for accuracy, though some syntax or spelling errors may persist. Please contact the author of this note for any clarification.    Chato Franklin M.D.       Office: [phone]   [address] [place] [address]  FAX: [phone]

## 2025-05-05 ENCOUNTER — PATIENT MESSAGE (OUTPATIENT)
Dept: NEUROSURGERY | Facility: CLINIC | Age: 55
End: 2025-05-05
Payer: COMMERCIAL

## 2025-05-22 ENCOUNTER — PATIENT MESSAGE (OUTPATIENT)
Dept: NEUROSURGERY | Facility: CLINIC | Age: 55
End: 2025-05-22

## 2025-05-22 ENCOUNTER — HOSPITAL ENCOUNTER (OUTPATIENT)
Dept: RADIOLOGY | Facility: HOSPITAL | Age: 55
Discharge: HOME | End: 2025-05-22
Attending: NURSE PRACTITIONER
Payer: COMMERCIAL

## 2025-05-22 DIAGNOSIS — R93.7 ABNORMAL MRI, THORACIC SPINE: ICD-10-CM

## 2025-05-22 DIAGNOSIS — M48.00 CENTRAL STENOSIS OF SPINAL CANAL: ICD-10-CM

## 2025-05-22 PROCEDURE — 72082 X-RAY EXAM ENTIRE SPI 2/3 VW: CPT | Mod: TC

## 2025-05-22 PROCEDURE — 72082 X-RAY EXAM ENTIRE SPI 2/3 VW: CPT | Mod: 26,,, | Performed by: RADIOLOGY

## 2025-07-08 ENCOUNTER — PATIENT MESSAGE (OUTPATIENT)
Dept: FAMILY MEDICINE | Facility: CLINIC | Age: 55
End: 2025-07-08
Payer: COMMERCIAL

## 2025-07-08 DIAGNOSIS — E04.1 THYROID NODULE: ICD-10-CM

## 2025-07-08 DIAGNOSIS — R53.83 FATIGUE, UNSPECIFIED TYPE: Chronic | ICD-10-CM

## 2025-07-08 DIAGNOSIS — Z79.899 ENCOUNTER FOR LONG-TERM (CURRENT) USE OF MEDICATIONS: Primary | ICD-10-CM

## 2025-07-08 DIAGNOSIS — E78.5 HYPERLIPIDEMIA, UNSPECIFIED HYPERLIPIDEMIA TYPE: ICD-10-CM

## 2025-07-08 DIAGNOSIS — Z12.5 SCREENING PSA (PROSTATE SPECIFIC ANTIGEN): ICD-10-CM

## 2025-07-09 NOTE — TELEPHONE ENCOUNTER
I have signed for the following orders AND/OR meds.  Please call the patient and ask the patient to schedule the testing AND/OR inform about any medications that were sent.      Orders Placed This Encounter   Procedures    CBC Without Differential     Standing Status:   Future     Expected Date:   7/9/2025     Expiration Date:   9/7/2026    Comprehensive Metabolic Panel     Standing Status:   Future     Expected Date:   7/9/2025     Expiration Date:   9/7/2026    TSH     Standing Status:   Future     Expected Date:   7/9/2025     Expiration Date:   9/7/2026    Hemoglobin A1C     Standing Status:   Future     Expected Date:   7/9/2025     Expiration Date:   9/7/2026    Lipid Panel     Standing Status:   Future     Expected Date:   7/9/2025     Expiration Date:   9/7/2026    PSA, Screening     Standing Status:   Future     Expected Date:   7/9/2025     Expiration Date:   7/10/2026     Send normal result to authorizing provider's In Basket if patient is active on MyChart::   Yes    Testosterone,Total     Standing Status:   Future     Expected Date:   7/9/2025     Expiration Date:   10/7/2026     Send normal result to authorizing provider's In Basket if patient is active on MyChart::   Yes

## 2025-07-14 ENCOUNTER — LAB VISIT (OUTPATIENT)
Dept: LAB | Facility: HOSPITAL | Age: 55
End: 2025-07-14
Attending: FAMILY MEDICINE
Payer: COMMERCIAL

## 2025-07-14 DIAGNOSIS — Z79.899 ENCOUNTER FOR LONG-TERM (CURRENT) USE OF MEDICATIONS: ICD-10-CM

## 2025-07-14 DIAGNOSIS — Z12.5 SCREENING PSA (PROSTATE SPECIFIC ANTIGEN): ICD-10-CM

## 2025-07-14 DIAGNOSIS — R53.83 FATIGUE, UNSPECIFIED TYPE: Chronic | ICD-10-CM

## 2025-07-14 DIAGNOSIS — E04.1 THYROID NODULE: ICD-10-CM

## 2025-07-14 DIAGNOSIS — E78.5 HYPERLIPIDEMIA, UNSPECIFIED HYPERLIPIDEMIA TYPE: ICD-10-CM

## 2025-07-14 LAB
ALBUMIN SERPL BCP-MCNC: 4.1 G/DL (ref 3.5–5.2)
ALP SERPL-CCNC: 75 UNIT/L (ref 40–150)
ALT SERPL W/O P-5'-P-CCNC: 42 UNIT/L (ref 10–44)
ANION GAP (OHS): 8 MMOL/L (ref 8–16)
AST SERPL-CCNC: 29 UNIT/L (ref 11–45)
BILIRUB SERPL-MCNC: 0.7 MG/DL (ref 0.1–1)
BUN SERPL-MCNC: 17 MG/DL (ref 6–20)
CALCIUM SERPL-MCNC: 8.8 MG/DL (ref 8.7–10.5)
CHLORIDE SERPL-SCNC: 107 MMOL/L (ref 95–110)
CHOLEST SERPL-MCNC: 189 MG/DL (ref 120–199)
CHOLEST/HDLC SERPL: 3.7 {RATIO} (ref 2–5)
CO2 SERPL-SCNC: 25 MMOL/L (ref 23–29)
CREAT SERPL-MCNC: 1 MG/DL (ref 0.5–1.4)
ERYTHROCYTE [DISTWIDTH] IN BLOOD BY AUTOMATED COUNT: 12.2 % (ref 11.5–14.5)
GFR SERPLBLD CREATININE-BSD FMLA CKD-EPI: >60 ML/MIN/1.73/M2
GLUCOSE SERPL-MCNC: 87 MG/DL (ref 70–110)
HCT VFR BLD AUTO: 43.4 % (ref 40–54)
HDLC SERPL-MCNC: 51 MG/DL (ref 40–75)
HDLC SERPL: 27 % (ref 20–50)
HGB BLD-MCNC: 14.8 GM/DL (ref 14–18)
LDLC SERPL CALC-MCNC: 127.6 MG/DL (ref 63–159)
MCH RBC QN AUTO: 31.3 PG (ref 27–31)
MCHC RBC AUTO-ENTMCNC: 34.1 G/DL (ref 32–36)
MCV RBC AUTO: 92 FL (ref 82–98)
NONHDLC SERPL-MCNC: 138 MG/DL
PLATELET # BLD AUTO: 214 K/UL (ref 150–450)
PMV BLD AUTO: 10.2 FL (ref 9.2–12.9)
POTASSIUM SERPL-SCNC: 4.2 MMOL/L (ref 3.5–5.1)
PROT SERPL-MCNC: 6.6 GM/DL (ref 6–8.4)
PSA SERPL-MCNC: 0.82 NG/ML
RBC # BLD AUTO: 4.73 M/UL (ref 4.6–6.2)
SODIUM SERPL-SCNC: 140 MMOL/L (ref 136–145)
TESTOST SERPL-MCNC: 655 NG/DL (ref 304–1227)
TRIGL SERPL-MCNC: 52 MG/DL (ref 30–150)
TSH SERPL-ACNC: 0.57 UIU/ML (ref 0.4–4)
WBC # BLD AUTO: 4.83 K/UL (ref 3.9–12.7)

## 2025-07-14 PROCEDURE — 36415 COLL VENOUS BLD VENIPUNCTURE: CPT | Mod: PO

## 2025-07-14 PROCEDURE — 80061 LIPID PANEL: CPT

## 2025-07-14 PROCEDURE — 84403 ASSAY OF TOTAL TESTOSTERONE: CPT

## 2025-07-14 PROCEDURE — 84443 ASSAY THYROID STIM HORMONE: CPT

## 2025-07-14 PROCEDURE — 85027 COMPLETE CBC AUTOMATED: CPT

## 2025-07-14 PROCEDURE — 83036 HEMOGLOBIN GLYCOSYLATED A1C: CPT

## 2025-07-14 PROCEDURE — 80053 COMPREHEN METABOLIC PANEL: CPT

## 2025-07-14 PROCEDURE — 84153 ASSAY OF PSA TOTAL: CPT

## 2025-07-15 LAB
EAG (OHS): 97 MG/DL (ref 68–131)
HBA1C MFR BLD: 5 % (ref 4–5.6)

## 2025-07-21 ENCOUNTER — TELEPHONE (OUTPATIENT)
Dept: SLEEP MEDICINE | Facility: CLINIC | Age: 55
End: 2025-07-21
Payer: COMMERCIAL

## 2025-07-21 ENCOUNTER — OFFICE VISIT (OUTPATIENT)
Dept: FAMILY MEDICINE | Facility: CLINIC | Age: 55
End: 2025-07-21
Payer: COMMERCIAL

## 2025-07-21 VITALS — HEIGHT: 69 IN | WEIGHT: 160 LBS | BODY MASS INDEX: 23.7 KG/M2

## 2025-07-21 DIAGNOSIS — E78.5 HYPERLIPIDEMIA, UNSPECIFIED HYPERLIPIDEMIA TYPE: ICD-10-CM

## 2025-07-21 DIAGNOSIS — G47.00 INSOMNIA, UNSPECIFIED TYPE: Primary | ICD-10-CM

## 2025-07-21 DIAGNOSIS — K21.00 GASTROESOPHAGEAL REFLUX DISEASE WITH ESOPHAGITIS WITHOUT HEMORRHAGE: ICD-10-CM

## 2025-07-21 DIAGNOSIS — Z79.899 ENCOUNTER FOR LONG-TERM (CURRENT) USE OF MEDICATIONS: ICD-10-CM

## 2025-07-21 PROCEDURE — G2211 COMPLEX E/M VISIT ADD ON: HCPCS | Mod: 95,,, | Performed by: FAMILY MEDICINE

## 2025-07-21 PROCEDURE — 1159F MED LIST DOCD IN RCRD: CPT | Mod: CPTII,95,, | Performed by: FAMILY MEDICINE

## 2025-07-21 PROCEDURE — 3044F HG A1C LEVEL LT 7.0%: CPT | Mod: CPTII,95,, | Performed by: FAMILY MEDICINE

## 2025-07-21 PROCEDURE — 1160F RVW MEDS BY RX/DR IN RCRD: CPT | Mod: CPTII,95,, | Performed by: FAMILY MEDICINE

## 2025-07-21 PROCEDURE — 98006 SYNCH AUDIO-VIDEO EST MOD 30: CPT | Mod: 95,,, | Performed by: FAMILY MEDICINE

## 2025-07-21 RX ORDER — PANTOPRAZOLE SODIUM 40 MG/1
40 TABLET, DELAYED RELEASE ORAL DAILY
Qty: 90 TABLET | Refills: 4 | Status: SHIPPED | OUTPATIENT
Start: 2025-07-21

## 2025-07-21 NOTE — ASSESSMENT & PLAN NOTE
Check home sleep study since it has been a few years.  Patient does have a thyroid nodule question possible lower respiratory obstruction that may be positional?  Follow-up with ENT if no improvement in sleep quality.  Consider sleep medicine consult if home sleep study is inconclusive.  Try melatonin and magnesium together.    Discussed insomnia condition course.  Advised of first-line medications for this condition.  Also discussed sleep hygiene.  Information was given below.  Good sleep habits (sometimes referred to as sleep hygiene) can help you get a good nights sleep.    Some habits that can improve your sleep health:  -Be consistent. Go to bed at the same time each night and get up at the same time each morning, including on the weekends  -Make sure your bedroom is quiet, dark, relaxing, and at a comfortable temperature  -Remove electronic devices, such as TVs, computers, and smart phones, from the bedroom  -Avoid large meals, caffeine, and alcohol before bedtime  -Get some exercise. Being physically active during the day can help you fall asleep more easily at night.

## 2025-07-21 NOTE — ASSESSMENT & PLAN NOTE
Counseled on hyperlipidemia disease course, healthy diet and increased need for exercise.  Please be advised of the risk of cardiovascular disease, increase stroke and heart attack risk with uncontrolled/untreated hyperlipidemia.     Patient voiced understanding and understood the treatment plan. All questions were answered.     Propranolol Counseling:  I discussed with the patient the risks of propranolol including but not limited to low heart rate, low blood pressure, low blood sugar, restlessness and increased cold sensitivity. They should call the office if they experience any of these side effects.

## 2025-07-21 NOTE — PROGRESS NOTES
The patient location is: [place]  The chief complaint leading to consultation is:   Chief Complaint   Patient presents with    Follow-up         Visit type: audiovisual    Face to Face time with patient: 21  31 minutes of total time spent on the encounter, which includes face to face time and non-face to face time preparing to see the patient (eg, review of tests), Obtaining and/or reviewing separately obtained history, Documenting clinical information in the electronic or other health record, Independently interpreting results (not separately reported) and communicating results to the patient/family/caregiver, or Care coordination (not separately reported).         Each patient to whom he or she provides medical services by telemedicine is:  (1) informed of the relationship between the physician and patient and the respective role of any other health care provider with respect to management of the patient; and (2) notified that he or she may decline to receive medical services by telemedicine and may withdraw from such care at any time.    PLAN:    Assessment & Plan    G47.00 Insomnia, unspecified type  Z79.899 Encounter for long-term (current) use of medications  K21.00 Gastroesophageal reflux disease with esophagitis without hemorrhage  E78.5 Hyperlipidemia, unspecified hyperlipidemia type    IMPRESSION:   Assessed sleep issues, considering potential causes such as reflux, positional discomfort, and thyroid nodule.   Evaluated recent lab results, noting all values WNL except for MCH, which was deemed clinically insignificant.   Discussed thyroid nodule management, agreeing with current monitoring approach rather than surgical intervention.    PLAN SUMMARY:   Started melatonin (up to 10 mg) and magnesium (400-500 mg) at bedtime   Continued Pantoprazole in the morning before eating   Continued B12 and vitamin D with breakfast   Ordered home sleep study to investigate potential sleep disorders   Ordered CT calcium  score   Recommend sleep hygiene practices and optimal sleep positioning    INSOMNIA, UNSPECIFIED TYPE:   Explained importance of adequate sleep (7-8 hours) for overall health.   Discussed sleep hygiene practices: limiting screen time, food, and liquid intake before bed; maintaining a cold, dark sleeping environment.   Consider using a body pillow or wedge pillow to maintain optimal sleep position.   Started melatonin (up to 10 mg) and magnesium (400-500 mg) at bedtime.   Ordered home sleep study to investigate potential sleep apnea or other sleep disorders.   Informed about potential side effects of sleep medications on prostate health.    ENCOUNTER FOR LONG-TERM (CURRENT) USE OF MEDICATIONS:   Continued Pantoprazole in the morning before eating, B12 and vitamin D with breakfast.    GASTROESOPHAGEAL REFLUX DISEASE WITH ESOPHAGITIS WITHOUT HEMORRHAGE:   Raise head of bed if reflux is suspected.   Continued Pantoprazole in the morning before eating.    HYPERLIPIDEMIA, UNSPECIFIED HYPERLIPIDEMIA TYPE:   Ordered CT calcium score.       Patient may try to go to [facility] for CT calcium score that has been ordered previously.  Follow-up with Cardiology.  Problem List Items Addressed This Visit       Hyperlipidemia (Chronic)    Counseled on hyperlipidemia disease course, healthy diet and increased need for exercise.  Please be advised of the risk of cardiovascular disease, increase stroke and heart attack risk with uncontrolled/untreated hyperlipidemia.     Patient voiced understanding and understood the treatment plan. All questions were answered.           Relevant Orders    CBC Without Differential    Comprehensive Metabolic Panel    TSH    Hemoglobin A1C    Lipid Panel    Encounter for long-term (current) use of medications (Chronic)    Complete history and physical was completed today.  Complete and thorough medication reconciliation was performed.  Discussed risks and benefits of medications.  Advised patient on  orders and health maintenance.  We discussed old records and old labs if available.  Will request any records not available through epic.  Continue current medications listed on your summary sheet.           Relevant Medications    pantoprazole (PROTONIX) 40 MG tablet    Other Relevant Orders    CBC Without Differential    Comprehensive Metabolic Panel    TSH    Hemoglobin A1C    Lipid Panel    Gastroesophageal reflux disease with esophagitis without hemorrhage (Chronic)    GERD RECOMMENDATIONS  Please be advised of condition course.  - Take PPI in the morning 30-60 minutes before breakfast  - I recommend ongoing Education for lifestyle modifications to help control/reduce reflux/abdominal pain including: avoid large meals, avoid eating within 2-3 hours of bedtime (avoid late night eating & lying down soon after eating), elevate head of bed if nocturnal symptoms are present, smoking cessation (if current smoker), & weight loss (if overweight).   - please be advised to avoid known foods which trigger reflux symptoms & to minimize/avoid high-fat foods, chocolate, caffeine, citrus, alcohol, & tomato products.  - Advised to avoid/limit use of NSAID's, since they can cause GI upset, bleeding, and/or ulcers. If needed, take with food.          Relevant Medications    pantoprazole (PROTONIX) 40 MG tablet    Other Relevant Orders    CBC Without Differential    Comprehensive Metabolic Panel    TSH    Hemoglobin A1C    Lipid Panel    Insomnia - Primary    Check home sleep study since it has been a few years.  Patient does have a thyroid nodule question possible lower respiratory obstruction that may be positional?  Follow-up with ENT if no improvement in sleep quality.  Consider sleep medicine consult if home sleep study is inconclusive.  Try melatonin and magnesium together.    Discussed insomnia condition course.  Advised of first-line medications for this condition.  Also discussed sleep hygiene.  Information was given  below.  Good sleep habits (sometimes referred to as sleep hygiene) can help you get a good nights sleep.    Some habits that can improve your sleep health:  -Be consistent. Go to bed at the same time each night and get up at the same time each morning, including on the weekends  -Make sure your bedroom is quiet, dark, relaxing, and at a comfortable temperature  -Remove electronic devices, such as TVs, computers, and smart phones, from the bedroom  -Avoid large meals, caffeine, and alcohol before bedtime  -Get some exercise. Being physically active during the day can help you fall asleep more easily at night.             Relevant Orders    Home Sleep Study    CBC Without Differential    Comprehensive Metabolic Panel    TSH    Hemoglobin A1C    Lipid Panel        Medication Management for assessment above:   Medication List with Changes/Refills   Current Medications    CHOLECALCIFEROL, VITAMIN D3, (VITAMIN D3) 25 MCG (1,000 UNIT) CAPSULE    Take 1,000 Units by mouth.    CYANOCOBALAMIN (VITAMIN B-12) 1000 MCG TABLET    Take 1,000 mcg by mouth.    PRASTERONE, DHEA, 25 MG CAP    Take 1 capsule by mouth.   Changed and/or Refilled Medications    Modified Medication Previous Medication    PANTOPRAZOLE (PROTONIX) 40 MG TABLET pantoprazole (PROTONIX) 40 MG tablet       Take 1 tablet (40 mg total) by mouth once daily. Okay to take every other day if needed.    Take 1 tablet (40 mg total) by mouth once daily. Okay to take every other day if needed.   Discontinued Medications    FAMOTIDINE (PEPCID) 40 MG TABLET    Take 1 tablet (40 mg total) by mouth once daily.    SILODOSIN (RAPAFLO) 4 MG CAP CAPSULE    Take 4 mg by mouth once daily.       Chato Franklin M.D.  ==========================================================================  Subjective:   Patient ID: Fortino Castro is a 54 y.o. male.  has a past medical history of GERD (gastroesophageal reflux disease) and Thyroid nodule (12/7/2019).   Chief Complaint:  Follow-up      History of Present Illness    CHIEF COMPLAINT:  Patient presents for follow-up on chronic medical conditions and to discuss recent labs results.    HPI:  Patient reports ongoing sleep issues, typically getting 4-5 hours of sleep per night. He falls asleep quickly but wakes up 3-4 hours later, often finding it difficult to fall back asleep. Sometimes he falls asleep at 11:00 PM and wakes up at 3:30 or 4:00 AM, unable to return to sleep. He mentions slightly improved sleep in the past couple of nights. He has been attempting to stop eating before 7:00 PM and limit snacks after that time, suspecting late eating might cause indigestion. These efforts have not fully resolved his sleep disturbances.    Patient reports feeling extremely fatigued by 2:00 or 3:00 PM, stating he could fall asleep if sedentary. He has tried using magnesium citrate gummies (3 per serving) to help with sleep, noting they contain 4 g of sugar per serving. He had a home sleep study about 5-6 years ago, which showed occasional brief pauses in breathing but was not conclusive for any specific sleep disorder.    Patient mentions occasionally waking up with tachycardia, particularly when turning to lay prone. He speculates this might be due to potential airway obstruction from his pillow.    Patient reports having thyroid nodules that are being monitored. A recent ultrasound showed an increase in the volume of a thyroid nodule, although the diameter only increased by a few mm. The nodule is currently 2.8 cm in size. He has had fine needle aspiration (FNA) biopsies in the past, which were benign.    Patient denies any other specific symptoms.    MEDICATIONS:  Patient is on Pantoprazole, taken in the morning before eating, for reflux concerns. He takes B12 and Vitamin D in the morning with breakfast. He also takes Magnesium citrate gummies, 3 gummies, which contain 4 g of sugar per serving. Patient has discontinued Rapaflo and Famotidine  (Pepcid).    MEDICAL HISTORY:  Patient has a history of sleep issues and a thyroid nodule.      ROS:  ROS as indicated in HPI.         Problem List Items Addressed This Visit       Hyperlipidemia (Chronic)    Overview   July 2025: CHRONIC. STABLE. Lab analysis reviewed.   (-) CP, SOB, abdominal pain, N/V/D, constipation, jaundice, skin changes.  (-) Myalgias  Lab Results   Component Value Date    CHOL 189 07/14/2025    CHOL 185 03/16/2023    CHOL 224 (H) 01/05/2021     Lab Results   Component Value Date    HDL 51 07/14/2025    HDL 48 (L) 03/16/2023    HDL 62 01/05/2021     Lab Results   Component Value Date    LDLCALC 127.6 07/14/2025    LDLCALC 117 (H) 03/16/2023    LDLCALC 147 (H) 01/05/2021     Lab Results   Component Value Date    TRIG 52 07/14/2025    TRIG 97 03/16/2023    TRIG 55 01/05/2021     Lab Results   Component Value Date    CHOLHDL 27.0 07/14/2025    CHOLHDL 3.6 01/05/2021     Lab Results   Component Value Date    TOTALCHOLEST 3.7 07/14/2025     Lab Results   Component Value Date    ALT 42 07/14/2025    AST 29 07/14/2025    ALKPHOS 75 07/14/2025    BILITOT 0.7 07/14/2025     ======================================================  The 10-year ASCVD risk score (Mary KWON, et al., 2019) is: 3%    Values used to calculate the score:      Age: 54 years      Sex: Male      Is Non- : No      Diabetic: No      Tobacco smoker: No      Systolic Blood Pressure: 105 mmHg      Is BP treated: No      HDL Cholesterol: 51 mg/dL      Total Cholesterol: 189 mg/dL           Current Assessment & Plan   Counseled on hyperlipidemia disease course, healthy diet and increased need for exercise.  Please be advised of the risk of cardiovascular disease, increase stroke and heart attack risk with uncontrolled/untreated hyperlipidemia.     Patient voiced understanding and understood the treatment plan. All questions were answered.           Encounter for long-term (current) use of medications (Chronic)     Overview   July 2025:  Reviewed labs.  CHRONIC. Stable. Compliant with medications for managed conditions. See medication list. No SE reported.   Routine lab analysis is being monitored. Refills were addressed.  Lab Results   Component Value Date    WBC 4.83 07/14/2025    HGB 14.8 07/14/2025    HCT 43.4 07/14/2025    MCV 92 07/14/2025     07/14/2025         Chemistry        Component Value Date/Time     07/14/2025 0822     03/16/2023 0000    K 4.2 07/14/2025 0822    K 4.0 03/16/2023 0000     07/14/2025 0822     03/16/2023 0000    CO2 25 07/14/2025 0822    CO2 27 03/16/2023 0000    BUN 17 07/14/2025 0822    CREATININE 1.0 07/14/2025 0822    GLU 87 07/14/2025 0822    GLU 88 03/16/2023 0000        Component Value Date/Time    CALCIUM 8.8 07/14/2025 0822    CALCIUM 9.4 03/16/2023 0000    ALKPHOS 75 07/14/2025 0822    ALKPHOS 55 03/16/2023 0000    AST 29 07/14/2025 0822    AST 21 03/16/2023 0000    ALT 42 07/14/2025 0822    ALT 27 03/16/2023 0000    BILITOT 0.7 07/14/2025 0822    BILITOT 0.8 03/16/2023 0000    ESTGFRAFRICA 116 01/05/2021 0729    EGFRNONAA 100 01/05/2021 0729          Lab Results   Component Value Date    TSH 0.570 07/14/2025    T3FREE 3.5 01/05/2021              Current Assessment & Plan   Complete history and physical was completed today.  Complete and thorough medication reconciliation was performed.  Discussed risks and benefits of medications.  Advised patient on orders and health maintenance.  We discussed old records and old labs if available.  Will request any records not available through epic.  Continue current medications listed on your summary sheet.           Gastroesophageal reflux disease with esophagitis without hemorrhage (Chronic)    Overview   Chronic. Intermittent control.  Patient on pantoprazole 40 milligrams daily reports compliance no side effects reported symptoms are sometimes worse at night.  He is off of the Pepcid.         Current Assessment &  Plan   GERD RECOMMENDATIONS  Please be advised of condition course.  - Take PPI in the morning 30-60 minutes before breakfast  - I recommend ongoing Education for lifestyle modifications to help control/reduce reflux/abdominal pain including: avoid large meals, avoid eating within 2-3 hours of bedtime (avoid late night eating & lying down soon after eating), elevate head of bed if nocturnal symptoms are present, smoking cessation (if current smoker), & weight loss (if overweight).   - please be advised to avoid known foods which trigger reflux symptoms & to minimize/avoid high-fat foods, chocolate, caffeine, citrus, alcohol, & tomato products.  - Advised to avoid/limit use of NSAID's, since they can cause GI upset, bleeding, and/or ulcers. If needed, take with food.          Insomnia - Primary    Overview   Chronic.  Intermittent control.  Patient had a home sleep study years ago by ENT that was inconclusive.  He does try melatonin and magnesium but not together.  Patient getting about 4 to 5 hours of sleep.         Current Assessment & Plan   Check home sleep study since it has been a few years.  Patient does have a thyroid nodule question possible lower respiratory obstruction that may be positional?  Follow-up with ENT if no improvement in sleep quality.  Consider sleep medicine consult if home sleep study is inconclusive.  Try melatonin and magnesium together.    Discussed insomnia condition course.  Advised of first-line medications for this condition.  Also discussed sleep hygiene.  Information was given below.  Good sleep habits (sometimes referred to as sleep hygiene) can help you get a good nights sleep.    Some habits that can improve your sleep health:  -Be consistent. Go to bed at the same time each night and get up at the same time each morning, including on the weekends  -Make sure your bedroom is quiet, dark, relaxing, and at a comfortable temperature  -Remove electronic devices, such as TVs,  "computers, and smart phones, from the bedroom  -Avoid large meals, caffeine, and alcohol before bedtime  -Get some exercise. Being physically active during the day can help you fall asleep more easily at night.                 Review of patient's allergies indicates:   Allergen Reactions    Clarithromycin Palpitations    Sulfa (sulfonamide antibiotics) Hives    Sulfamethoxazole-trimethoprim Hives and Itching     Current Outpatient Medications   Medication Instructions    cholecalciferol (vitamin D3) (VITAMIN D3) 1,000 Units    cyanocobalamin (VITAMIN B-12) 1,000 mcg    pantoprazole (PROTONIX) 40 mg, Oral, Daily, Okay to take every other day if needed.    prasterone, dhea, 25 mg Cap 1 capsule      I have reviewed the PMH, social history, FamilyHx, surgical history, allergies and medications documented / confirmed by the patient at the time of this visit.  Review of Systems   Constitutional:  Positive for fatigue. Negative for activity change and unexpected weight change.   HENT:  Negative for hearing loss, rhinorrhea and trouble swallowing.    Eyes:  Negative for discharge and visual disturbance.   Respiratory:  Negative for chest tightness and wheezing.    Cardiovascular:  Negative for chest pain and palpitations.   Gastrointestinal:  Negative for blood in stool, constipation, diarrhea and vomiting.   Endocrine: Negative for polydipsia and polyuria.   Genitourinary:  Negative for difficulty urinating, hematuria and urgency.   Musculoskeletal:  Negative for arthralgias, joint swelling and neck pain.   Neurological:  Negative for weakness and headaches.   Psychiatric/Behavioral:  Positive for sleep disturbance. Negative for confusion and dysphoric mood.      Objective:   Ht 5' 9" (1.753 m)   Wt 72.6 kg (160 lb)   BMI 23.63 kg/m²   Physical Exam  Constitutional:       General: He is not in acute distress.     Appearance: He is well-developed. He is not diaphoretic.   HENT:      Head: Normocephalic and atraumatic. "   Eyes:      Extraocular Movements: Extraocular movements intact.      Pupils: Pupils are equal, round, and reactive to light.   Pulmonary:      Effort: Pulmonary effort is normal.   Musculoskeletal:         General: Normal range of motion.      Cervical back: Normal range of motion.   Skin:     Findings: No rash.   Neurological:      Mental Status: He is alert and oriented to person, place, and time.   Psychiatric:         Attention and Perception: He is attentive.         Mood and Affect: Mood normal. Mood is not anxious or depressed. Affect is not labile, blunt, angry or inappropriate.         Speech: He is communicative. Speech is not rapid and pressured, delayed, slurred or tangential.         Behavior: Behavior normal. Behavior is not agitated, slowed, aggressive, withdrawn, hyperactive or combative.         Thought Content: Thought content normal. Thought content is not paranoid or delusional. Thought content does not include homicidal or suicidal ideation. Thought content does not include homicidal or suicidal plan.         Cognition and Memory: Memory is not impaired.         Judgment: Judgment normal. Judgment is not impulsive or inappropriate.         Assessment:     1. Insomnia, unspecified type    2. Encounter for long-term (current) use of medications    3. Gastroesophageal reflux disease with esophagitis without hemorrhage    4. Hyperlipidemia, unspecified hyperlipidemia type      MDM:   Moderate medical complexity.  Moderate risk.  Total time: 31 minutes.  This includes total time spent on the encounter, which includes face to face time and non-face to face time preparing to see the patient (eg, review of previous medical records, tests), Obtaining and/or reviewing separately obtained history, documenting clinical information in the electronic or other health record, independently interpreting results (not separately reported)/communicating results to the patient/family/caregiver, and/or care  coordination (not separately reported).    I have Reviewed and summarized old records.  I have performed thorough medication reconciliation today and discussed risk and benefits of medications.  I have reviewed labs and discussed with patient.  All questions were answered.  I am requesting old records and will review them once they are available.  [facility]in[facility]  Visit today included increased complexity associated with the care of the episodic problem see above assessment addressed and managing the longitudinal care of the patient due to the serious and/or complex managed problem(s) see above.    I have signed for the following orders AND/OR meds.  Orders Placed This Encounter   Procedures    CBC Without Differential     Standing Status:   Future     Expected Date:   7/21/2025     Expiration Date:   9/19/2026    Comprehensive Metabolic Panel     Standing Status:   Future     Expected Date:   7/21/2025     Expiration Date:   9/19/2026    TSH     Standing Status:   Future     Expected Date:   7/21/2025     Expiration Date:   9/19/2026    Hemoglobin A1C     Standing Status:   Future     Expected Date:   7/21/2025     Expiration Date:   9/19/2026    Lipid Panel     Standing Status:   Future     Expected Date:   7/21/2025     Expiration Date:   9/19/2026    Home Sleep Study     Standing Status:   Future     Expiration Date:   7/21/2026     Medications Ordered This Encounter   Medications    pantoprazole (PROTONIX) 40 MG tablet     Sig: Take 1 tablet (40 mg total) by mouth once daily. Okay to take every other day if needed.     Dispense:  90 tablet     Refill:  4        Follow up in about 6 months (around 1/21/2026), or if symptoms worsen or fail to improve, for Med refills, LAB RESULTS.    If no improvement in symptoms or symptoms worsen, advised to call/follow-up at clinic or go to ER. Patient voiced understanding and all questions/concerns were addressed.   DISCLAIMER: This note was compiled by using a speech  recognition dictation system and therefore please be aware that typographical / speech recognition errors can and do occur.  Please contact me if you see any errors specifically.  Consent was obtained for recording system prior to the visit.    This note was generated with the assistance of ambient listening technology. Verbal consent was obtained by the patient and accompanying visitor(s) for the recording of patient appointment to facilitate this note. I attest to having reviewed and edited the generated note for accuracy, though some syntax or spelling errors may persist. Please contact the author of this note for any clarification.          Chato Franklin M.D.       Office: [phone]   [address] [place] [address]  FAX: [phone]

## 2025-08-13 ENCOUNTER — PATIENT MESSAGE (OUTPATIENT)
Dept: FAMILY MEDICINE | Facility: CLINIC | Age: 55
End: 2025-08-13
Payer: COMMERCIAL

## 2025-08-14 ENCOUNTER — HOSPITAL ENCOUNTER (OUTPATIENT)
Dept: SLEEP MEDICINE | Facility: HOSPITAL | Age: 55
Discharge: HOME | End: 2025-08-14
Attending: FAMILY MEDICINE
Payer: COMMERCIAL

## 2025-08-14 DIAGNOSIS — G47.00 INSOMNIA, UNSPECIFIED TYPE: ICD-10-CM

## 2025-08-14 PROCEDURE — 95806 SLEEP STUDY UNATT&RESP EFFT: CPT | Performed by: INTERNAL MEDICINE

## 2025-08-20 ENCOUNTER — PATIENT MESSAGE (OUTPATIENT)
Dept: FAMILY MEDICINE | Facility: CLINIC | Age: 55
End: 2025-08-20
Payer: COMMERCIAL

## 2025-08-20 ENCOUNTER — TELEPHONE (OUTPATIENT)
Dept: PULMONOLOGY | Facility: CLINIC | Age: 55
End: 2025-08-20
Payer: COMMERCIAL

## 2025-08-27 ENCOUNTER — TELEPHONE (OUTPATIENT)
Dept: PULMONOLOGY | Facility: CLINIC | Age: 55
End: 2025-08-27
Payer: COMMERCIAL